# Patient Record
Sex: FEMALE | Race: WHITE | Employment: PART TIME | URBAN - METROPOLITAN AREA
[De-identification: names, ages, dates, MRNs, and addresses within clinical notes are randomized per-mention and may not be internally consistent; named-entity substitution may affect disease eponyms.]

---

## 2017-09-25 PROBLEM — Z11.4 SCREENING FOR HIV (HUMAN IMMUNODEFICIENCY VIRUS): Status: ACTIVE | Noted: 2017-09-25

## 2017-09-25 PROBLEM — R53.83 FATIGUE: Status: ACTIVE | Noted: 2017-09-25

## 2017-09-25 PROBLEM — R20.2 PARESTHESIA: Status: ACTIVE | Noted: 2017-09-25

## 2017-09-25 PROBLEM — M25.50 ARTHRALGIA: Status: ACTIVE | Noted: 2017-09-25

## 2017-09-25 PROBLEM — M54.2 NECK PAIN: Status: ACTIVE | Noted: 2017-09-25

## 2017-11-13 PROBLEM — B19.20 HEPATITIS C VIRUS INFECTION WITHOUT HEPATIC COMA: Status: ACTIVE | Noted: 2017-11-13

## 2018-04-12 PROBLEM — Z11.4 SCREENING FOR HIV (HUMAN IMMUNODEFICIENCY VIRUS): Status: RESOLVED | Noted: 2017-09-25 | Resolved: 2018-04-12

## 2018-04-27 ENCOUNTER — HOSPITAL ENCOUNTER (EMERGENCY)
Age: 28
Discharge: HOME | End: 2018-04-28
Attending: EMERGENCY MEDICINE
Payer: MEDICARE

## 2018-04-27 DIAGNOSIS — F19.10 POLYSUBSTANCE ABUSE (HCC): Primary | ICD-10-CM

## 2018-04-27 LAB
EKG ATRIAL RATE: 55 BPM
EKG P AXIS: -4 DEGREES
EKG P-R INTERVAL: 112 MS
EKG Q-T INTERVAL: 466 MS
EKG QRS DURATION: 96 MS
EKG QTC CALCULATION (BAZETT): 445 MS
EKG R AXIS: 75 DEGREES
EKG T AXIS: 62 DEGREES
EKG VENTRICULAR RATE: 55 BPM

## 2018-04-27 PROCEDURE — 84443 ASSAY THYROID STIM HORMONE: CPT

## 2018-04-27 PROCEDURE — 80053 COMPREHEN METABOLIC PANEL: CPT

## 2018-04-27 PROCEDURE — G0480 DRUG TEST DEF 1-7 CLASSES: HCPCS

## 2018-04-27 PROCEDURE — 93005 ELECTROCARDIOGRAM TRACING: CPT | Performed by: STUDENT IN AN ORGANIZED HEALTH CARE EDUCATION/TRAINING PROGRAM

## 2018-04-27 PROCEDURE — 85025 COMPLETE CBC W/AUTO DIFF WBC: CPT

## 2018-04-27 PROCEDURE — 80307 DRUG TEST PRSMV CHEM ANLYZR: CPT

## 2018-04-27 PROCEDURE — 36415 COLL VENOUS BLD VENIPUNCTURE: CPT

## 2018-04-27 PROCEDURE — 99285 EMERGENCY DEPT VISIT HI MDM: CPT

## 2018-04-28 ENCOUNTER — APPOINTMENT (OUTPATIENT)
Dept: GENERAL RADIOLOGY | Age: 28
End: 2018-04-28
Payer: MEDICARE

## 2018-04-28 ENCOUNTER — APPOINTMENT (OUTPATIENT)
Dept: CT IMAGING | Age: 28
End: 2018-04-28
Payer: MEDICARE

## 2018-04-28 VITALS
BODY MASS INDEX: 24.91 KG/M2 | TEMPERATURE: 98.7 F | SYSTOLIC BLOOD PRESSURE: 128 MMHG | HEIGHT: 66 IN | OXYGEN SATURATION: 100 % | HEART RATE: 67 BPM | DIASTOLIC BLOOD PRESSURE: 68 MMHG | RESPIRATION RATE: 14 BRPM | WEIGHT: 155 LBS

## 2018-04-28 LAB
ACETAMINOPHEN LEVEL: <5 MCG/ML (ref 10–30)
ALBUMIN SERPL-MCNC: 4 G/DL (ref 3.5–5.2)
ALP BLD-CCNC: 61 U/L (ref 35–104)
ALT SERPL-CCNC: 58 U/L (ref 0–32)
AMPHETAMINE SCREEN, URINE: NOT DETECTED
ANION GAP SERPL CALCULATED.3IONS-SCNC: 12 MMOL/L (ref 7–16)
AST SERPL-CCNC: 35 U/L (ref 0–31)
BARBITURATE SCREEN URINE: NOT DETECTED
BASOPHILS ABSOLUTE: 0.04 E9/L (ref 0–0.2)
BASOPHILS RELATIVE PERCENT: 0.6 % (ref 0–2)
BENZODIAZEPINE SCREEN, URINE: NOT DETECTED
BILIRUB SERPL-MCNC: 0.4 MG/DL (ref 0–1.2)
BUN BLDV-MCNC: 5 MG/DL (ref 6–20)
CALCIUM SERPL-MCNC: 9.3 MG/DL (ref 8.6–10.2)
CANNABINOID SCREEN URINE: POSITIVE
CHLORIDE BLD-SCNC: 103 MMOL/L (ref 98–107)
CHP ED QC CHECK: NORMAL
CO2: 27 MMOL/L (ref 22–29)
COCAINE METABOLITE SCREEN URINE: POSITIVE
CREAT SERPL-MCNC: 0.6 MG/DL (ref 0.5–1)
EOSINOPHILS ABSOLUTE: 0.15 E9/L (ref 0.05–0.5)
EOSINOPHILS RELATIVE PERCENT: 2.3 % (ref 0–6)
ETHANOL: <10 MG/DL (ref 0–0.08)
GFR AFRICAN AMERICAN: >60
GFR NON-AFRICAN AMERICAN: >60 ML/MIN/1.73
GLUCOSE BLD-MCNC: 121 MG/DL (ref 74–109)
HCT VFR BLD CALC: 39.3 % (ref 34–48)
HEMOGLOBIN: 12.9 G/DL (ref 11.5–15.5)
IMMATURE GRANULOCYTES #: 0.01 E9/L
IMMATURE GRANULOCYTES %: 0.2 % (ref 0–5)
LYMPHOCYTES ABSOLUTE: 2.78 E9/L (ref 1.5–4)
LYMPHOCYTES RELATIVE PERCENT: 43 % (ref 20–42)
MCH RBC QN AUTO: 28.6 PG (ref 26–35)
MCHC RBC AUTO-ENTMCNC: 32.8 % (ref 32–34.5)
MCV RBC AUTO: 87.1 FL (ref 80–99.9)
METHADONE SCREEN, URINE: NOT DETECTED
MONOCYTES ABSOLUTE: 0.59 E9/L (ref 0.1–0.95)
MONOCYTES RELATIVE PERCENT: 9.1 % (ref 2–12)
NEUTROPHILS ABSOLUTE: 2.89 E9/L (ref 1.8–7.3)
NEUTROPHILS RELATIVE PERCENT: 44.8 % (ref 43–80)
OPIATE SCREEN URINE: NOT DETECTED
PDW BLD-RTO: 13.1 FL (ref 11.5–15)
PHENCYCLIDINE SCREEN URINE: NOT DETECTED
PLATELET # BLD: 195 E9/L (ref 130–450)
PMV BLD AUTO: 10.5 FL (ref 7–12)
POTASSIUM SERPL-SCNC: 4 MMOL/L (ref 3.5–5)
PREGNANCY TEST URINE, POC: NEGATIVE
PROPOXYPHENE SCREEN: NOT DETECTED
RBC # BLD: 4.51 E12/L (ref 3.5–5.5)
SALICYLATE, SERUM: <0.3 MG/DL (ref 0–30)
SODIUM BLD-SCNC: 142 MMOL/L (ref 132–146)
TOTAL PROTEIN: 7.1 G/DL (ref 6.4–8.3)
TRICYCLIC ANTIDEPRESSANTS SCREEN SERUM: NEGATIVE NG/ML
TSH SERPL DL<=0.05 MIU/L-ACNC: 0.57 UIU/ML (ref 0.27–4.2)
WBC # BLD: 6.5 E9/L (ref 4.5–11.5)

## 2018-04-28 PROCEDURE — 70450 CT HEAD/BRAIN W/O DYE: CPT

## 2018-04-28 PROCEDURE — 71045 X-RAY EXAM CHEST 1 VIEW: CPT

## 2018-05-02 LAB — COCAINE, CONFIRM, URINE: >1000 NG/ML

## 2018-05-03 LAB — CANNABINOIDS CONF, URINE: 98 NG/ML

## 2018-05-04 ENCOUNTER — HOSPITAL ENCOUNTER (EMERGENCY)
Age: 28
Discharge: LEFT BEFORE DISCHARGE | End: 2018-05-04
Payer: MEDICARE

## 2018-05-04 VITALS
BODY MASS INDEX: 29.99 KG/M2 | WEIGHT: 180 LBS | HEIGHT: 65 IN | TEMPERATURE: 98.4 F | DIASTOLIC BLOOD PRESSURE: 77 MMHG | OXYGEN SATURATION: 97 % | RESPIRATION RATE: 16 BRPM | HEART RATE: 64 BPM | SYSTOLIC BLOOD PRESSURE: 117 MMHG

## 2018-05-04 PROCEDURE — 99284 EMERGENCY DEPT VISIT MOD MDM: CPT

## 2018-05-10 ASSESSMENT — ENCOUNTER SYMPTOMS
EYE REDNESS: 0
ABDOMINAL DISTENTION: 0
SINUS PRESSURE: 0
NAUSEA: 0
RHINORRHEA: 0
EYE PAIN: 0
PHOTOPHOBIA: 0
SHORTNESS OF BREATH: 0
BLOOD IN STOOL: 0
VOMITING: 0
WHEEZING: 0
ABDOMINAL PAIN: 0
COUGH: 0
BACK PAIN: 0
CONSTIPATION: 0
TROUBLE SWALLOWING: 0
CHEST TIGHTNESS: 0
SORE THROAT: 0
DIARRHEA: 0

## 2024-11-09 ENCOUNTER — HOSPITAL ENCOUNTER (EMERGENCY)
Age: 34
Discharge: HOME | End: 2024-11-09
Attending: STUDENT IN AN ORGANIZED HEALTH CARE EDUCATION/TRAINING PROGRAM
Payer: COMMERCIAL

## 2024-11-09 ENCOUNTER — APPOINTMENT (OUTPATIENT)
Dept: GENERAL RADIOLOGY | Age: 34
End: 2024-11-09
Payer: COMMERCIAL

## 2024-11-09 VITALS
TEMPERATURE: 97.8 F | HEART RATE: 74 BPM | SYSTOLIC BLOOD PRESSURE: 131 MMHG | OXYGEN SATURATION: 99 % | RESPIRATION RATE: 17 BRPM | DIASTOLIC BLOOD PRESSURE: 79 MMHG

## 2024-11-09 DIAGNOSIS — R10.84 DIFFUSE ABDOMINAL PAIN: ICD-10-CM

## 2024-11-09 DIAGNOSIS — R05.9 COUGH, UNSPECIFIED TYPE: ICD-10-CM

## 2024-11-09 DIAGNOSIS — L02.414 ABSCESS OF LEFT ARM: Primary | ICD-10-CM

## 2024-11-09 LAB
ALBUMIN SERPL-MCNC: 3.8 G/DL (ref 3.5–5.2)
ALP SERPL-CCNC: 80 U/L (ref 35–104)
ALT SERPL-CCNC: 72 U/L (ref 0–32)
ANION GAP SERPL CALCULATED.3IONS-SCNC: 8 MMOL/L (ref 7–16)
AST SERPL-CCNC: 63 U/L (ref 0–31)
BACTERIA URNS QL MICRO: ABNORMAL
BASOPHILS # BLD: 0.05 K/UL (ref 0–0.2)
BASOPHILS NFR BLD: 1 % (ref 0–2)
BILIRUB SERPL-MCNC: 0.3 MG/DL (ref 0–1.2)
BILIRUB UR QL STRIP: NEGATIVE
BUN SERPL-MCNC: 10 MG/DL (ref 6–20)
CALCIUM SERPL-MCNC: 8.7 MG/DL (ref 8.6–10.2)
CHLORIDE SERPL-SCNC: 104 MMOL/L (ref 98–107)
CLARITY UR: CLEAR
CO2 SERPL-SCNC: 25 MMOL/L (ref 22–29)
COLOR UR: YELLOW
CREAT SERPL-MCNC: 0.6 MG/DL (ref 0.5–1)
EOSINOPHIL # BLD: 0.15 K/UL (ref 0.05–0.5)
EOSINOPHILS RELATIVE PERCENT: 2 % (ref 0–6)
ERYTHROCYTE [DISTWIDTH] IN BLOOD BY AUTOMATED COUNT: 12.9 % (ref 11.5–15)
FLUAV RNA RESP QL NAA+PROBE: NOT DETECTED
FLUBV RNA RESP QL NAA+PROBE: NOT DETECTED
GFR, ESTIMATED: >90 ML/MIN/1.73M2
GLUCOSE SERPL-MCNC: 88 MG/DL (ref 74–99)
GLUCOSE UR STRIP-MCNC: NEGATIVE MG/DL
HCG, URINE, POC: NEGATIVE
HCT VFR BLD AUTO: 39.5 % (ref 34–48)
HGB BLD-MCNC: 12.6 G/DL (ref 11.5–15.5)
HGB UR QL STRIP.AUTO: NEGATIVE
IMM GRANULOCYTES # BLD AUTO: <0.03 K/UL (ref 0–0.58)
IMM GRANULOCYTES NFR BLD: 0 % (ref 0–5)
KETONES UR STRIP-MCNC: NEGATIVE MG/DL
LACTATE BLDV-SCNC: 1.1 MMOL/L (ref 0.5–1.9)
LEUKOCYTE ESTERASE UR QL STRIP: NEGATIVE
LIPASE SERPL-CCNC: 19 U/L (ref 13–60)
LYMPHOCYTES NFR BLD: 2.94 K/UL (ref 1.5–4)
LYMPHOCYTES RELATIVE PERCENT: 40 % (ref 20–42)
Lab: NORMAL
MCH RBC QN AUTO: 28.4 PG (ref 26–35)
MCHC RBC AUTO-ENTMCNC: 31.9 G/DL (ref 32–34.5)
MCV RBC AUTO: 89.2 FL (ref 80–99.9)
MICROORGANISM SPEC CULT: NORMAL
MICROORGANISM/AGENT SPEC: NORMAL
MONOCYTES NFR BLD: 0.65 K/UL (ref 0.1–0.95)
MONOCYTES NFR BLD: 9 % (ref 2–12)
NEGATIVE QC PASS/FAIL: NORMAL
NEUTROPHILS NFR BLD: 49 % (ref 43–80)
NEUTS SEG NFR BLD: 3.58 K/UL (ref 1.8–7.3)
NITRITE UR QL STRIP: NEGATIVE
PH UR STRIP: 6 [PH] (ref 5–9)
PLATELET # BLD AUTO: 232 K/UL (ref 130–450)
PMV BLD AUTO: 10.8 FL (ref 7–12)
POSITIVE QC PASS/FAIL: NORMAL
POTASSIUM SERPL-SCNC: 5.2 MMOL/L (ref 3.5–5)
PROT SERPL-MCNC: 6.7 G/DL (ref 6.4–8.3)
PROT UR STRIP-MCNC: NEGATIVE MG/DL
RBC # BLD AUTO: 4.43 M/UL (ref 3.5–5.5)
RBC #/AREA URNS HPF: ABNORMAL /HPF
SARS-COV-2 RNA RESP QL NAA+PROBE: NOT DETECTED
SODIUM SERPL-SCNC: 137 MMOL/L (ref 132–146)
SOURCE: NORMAL
SP GR UR STRIP: 1.02 (ref 1–1.03)
SPECIMEN DESCRIPTION: NORMAL
SPECIMEN DESCRIPTION: NORMAL
UROBILINOGEN UR STRIP-ACNC: 0.2 EU/DL (ref 0–1)
WBC #/AREA URNS HPF: ABNORMAL /HPF
WBC OTHER # BLD: 7.4 K/UL (ref 4.5–11.5)

## 2024-11-09 PROCEDURE — 80053 COMPREHEN METABOLIC PANEL: CPT

## 2024-11-09 PROCEDURE — 96372 THER/PROPH/DIAG INJ SC/IM: CPT

## 2024-11-09 PROCEDURE — 83690 ASSAY OF LIPASE: CPT

## 2024-11-09 PROCEDURE — 99284 EMERGENCY DEPT VISIT MOD MDM: CPT

## 2024-11-09 PROCEDURE — 6370000000 HC RX 637 (ALT 250 FOR IP)

## 2024-11-09 PROCEDURE — 2500000003 HC RX 250 WO HCPCS

## 2024-11-09 PROCEDURE — 81001 URINALYSIS AUTO W/SCOPE: CPT

## 2024-11-09 PROCEDURE — 87205 SMEAR GRAM STAIN: CPT

## 2024-11-09 PROCEDURE — 87636 SARSCOV2 & INF A&B AMP PRB: CPT

## 2024-11-09 PROCEDURE — 71046 X-RAY EXAM CHEST 2 VIEWS: CPT

## 2024-11-09 PROCEDURE — 85025 COMPLETE CBC W/AUTO DIFF WBC: CPT

## 2024-11-09 PROCEDURE — 83605 ASSAY OF LACTIC ACID: CPT

## 2024-11-09 PROCEDURE — 6360000002 HC RX W HCPCS

## 2024-11-09 PROCEDURE — 87070 CULTURE OTHR SPECIMN AEROBIC: CPT

## 2024-11-09 PROCEDURE — 10061 I&D ABSCESS COMP/MULTIPLE: CPT

## 2024-11-09 RX ORDER — KETOROLAC TROMETHAMINE 30 MG/ML
15 INJECTION, SOLUTION INTRAMUSCULAR; INTRAVENOUS ONCE
Status: COMPLETED | OUTPATIENT
Start: 2024-11-09 | End: 2024-11-09

## 2024-11-09 RX ORDER — DOXYCYCLINE HYCLATE 100 MG
100 TABLET ORAL 2 TIMES DAILY
Qty: 14 TABLET | Refills: 0 | Status: SHIPPED | OUTPATIENT
Start: 2024-11-09 | End: 2024-11-16

## 2024-11-09 RX ORDER — LIDOCAINE HYDROCHLORIDE 10 MG/ML
5 INJECTION, SOLUTION INFILTRATION; PERINEURAL ONCE
Status: COMPLETED | OUTPATIENT
Start: 2024-11-09 | End: 2024-11-09

## 2024-11-09 RX ORDER — DOXYCYCLINE 100 MG/1
100 CAPSULE ORAL ONCE
Status: COMPLETED | OUTPATIENT
Start: 2024-11-09 | End: 2024-11-09

## 2024-11-09 RX ORDER — CEPHALEXIN 500 MG/1
500 CAPSULE ORAL 4 TIMES DAILY
Qty: 28 CAPSULE | Refills: 0 | Status: SHIPPED | OUTPATIENT
Start: 2024-11-09 | End: 2024-11-16

## 2024-11-09 RX ORDER — KETOROLAC TROMETHAMINE 30 MG/ML
15 INJECTION, SOLUTION INTRAMUSCULAR; INTRAVENOUS ONCE
Status: DISCONTINUED | OUTPATIENT
Start: 2024-11-09 | End: 2024-11-09

## 2024-11-09 RX ORDER — ONDANSETRON 2 MG/ML
4 INJECTION INTRAMUSCULAR; INTRAVENOUS ONCE
Status: DISCONTINUED | OUTPATIENT
Start: 2024-11-09 | End: 2024-11-09

## 2024-11-09 RX ORDER — ONDANSETRON 4 MG/1
4 TABLET, ORALLY DISINTEGRATING ORAL ONCE
Status: COMPLETED | OUTPATIENT
Start: 2024-11-09 | End: 2024-11-09

## 2024-11-09 RX ADMIN — KETOROLAC TROMETHAMINE 15 MG: 30 INJECTION, SOLUTION INTRAMUSCULAR at 20:30

## 2024-11-09 RX ADMIN — ONDANSETRON 4 MG: 4 TABLET, ORALLY DISINTEGRATING ORAL at 20:36

## 2024-11-09 RX ADMIN — LIDOCAINE HYDROCHLORIDE 5 ML: 10 INJECTION, SOLUTION INFILTRATION; PERINEURAL at 19:49

## 2024-11-09 RX ADMIN — DOXYCYCLINE HYCLATE 100 MG: 100 CAPSULE ORAL at 21:49

## 2024-11-09 RX ADMIN — CEPHALEXIN 250 MG: 250 CAPSULE ORAL at 21:50

## 2024-11-09 ASSESSMENT — PAIN DESCRIPTION - LOCATION: LOCATION: ARM

## 2024-11-09 ASSESSMENT — ENCOUNTER SYMPTOMS
NAUSEA: 1
ALLERGIC/IMMUNOLOGIC NEGATIVE: 1
COUGH: 1
ABDOMINAL PAIN: 1
EYES NEGATIVE: 1

## 2024-11-09 ASSESSMENT — LIFESTYLE VARIABLES
HOW MANY STANDARD DRINKS CONTAINING ALCOHOL DO YOU HAVE ON A TYPICAL DAY: PATIENT DOES NOT DRINK
HOW OFTEN DO YOU HAVE A DRINK CONTAINING ALCOHOL: NEVER

## 2024-11-09 ASSESSMENT — PAIN DESCRIPTION - ORIENTATION: ORIENTATION: LEFT;LOWER;MID

## 2024-11-09 ASSESSMENT — PAIN DESCRIPTION - DESCRIPTORS: DESCRIPTORS: THROBBING;ACHING;BURNING

## 2024-11-09 ASSESSMENT — PAIN SCALES - GENERAL: PAINLEVEL_OUTOF10: 10

## 2024-11-09 ASSESSMENT — PAIN - FUNCTIONAL ASSESSMENT: PAIN_FUNCTIONAL_ASSESSMENT: PREVENTS OR INTERFERES SOME ACTIVE ACTIVITIES AND ADLS

## 2024-11-10 NOTE — ED PROVIDER NOTES
[facility] EMERGENCY DEPARTMENT  EMERGENCY DEPARTMENT ENCOUNTER        Pt Name: Aileen Raman  MRN: [id number]  Birthdate 1990  Date of evaluation: 11/9/2024  Provider: VINCENT Price - CNP  PCP: Shawn Daniel DO  Note Started: 7:04 PM EST 11/9/24       I have seen and evaluated this patient with my supervising physician Ana Yoo DO.      CHIEF COMPLAINT       Chief Complaint   Patient presents with    Abscess     Patient has abscess on left forearm. Patient states she has used IV drugs, but not in that area.     Parasite     Patient states she has parasites coming out of her mouth, stool, and nose. \"I got samples.\"    Abdominal Pain    Cough    Nausea       HISTORY OF PRESENT ILLNESS: 1 or more Elements     History from : Patient    Limitations to history : None    Aileen Raman is a 34 y.o. female who presents to the ED with complaints of an abscess to her left forearm, cough, concern of coughing up parasites and parasites in her stool, nausea, and abdominal pain.  Patient is unable to state how long the symptoms have been going on for.  Patient states she did use IV methamphetamine a few times last week but has not used since.  Patient states prior to using methamphetamine last week she was clean for 5 years due to being in [facility].  Patient states she was recently released and her friends were using methamphetamine so she decided to use it with them.  Patient denies any chest pain, shortness of breath, headache, dizziness, lightheadedness, fever, chills, body aches, dysuria, vomiting, dark or bloody stools, SI, HI.  Patient denies any other symptoms.  Patient has no other complaints at this time.    Nursing Notes were all reviewed and agreed with or any disagreements were addressed in the HPI.    REVIEW OF SYSTEMS :      Review of Systems   Constitutional: Negative.    HENT: Negative.     Eyes: Negative.    Respiratory:  Positive

## 2024-11-12 LAB
MICROORGANISM SPEC CULT: NO GROWTH
MICROORGANISM/AGENT SPEC: NORMAL
SPECIMEN DESCRIPTION: NORMAL

## 2025-02-15 ENCOUNTER — HOSPITAL ENCOUNTER (INPATIENT)
Age: 35
LOS: 2 days | Discharge: HOME | DRG: 492 | End: 2025-02-18
Attending: EMERGENCY MEDICINE
Payer: OTHER MISCELLANEOUS

## 2025-02-15 DIAGNOSIS — S00.83XA CONTUSION OF FACE, INITIAL ENCOUNTER: ICD-10-CM

## 2025-02-15 DIAGNOSIS — S82.891A CLOSED FRACTURE DISLOCATION OF ANKLE JOINT, RIGHT, INITIAL ENCOUNTER: ICD-10-CM

## 2025-02-15 DIAGNOSIS — S20.212A CONTUSION OF LEFT CHEST WALL, INITIAL ENCOUNTER: ICD-10-CM
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
## 2025-02-15 LAB
B.E.: 0.3 MMOL/L (ref -3–3)
COHB: 3 % (ref 0–1.5)
CRITICAL: ABNORMAL
DATE ANALYZED: ABNORMAL
DATE OF COLLECTION: ABNORMAL
HCO3: 25.4 MMOL/L (ref 22–26)
HHB: 0.6 % (ref 0–5)
LAB: ABNORMAL
Lab: 2335
METHB: 0.2 % (ref 0–1.5)
MODE: ABNORMAL
O2 SATURATION: 99.4 % (ref 92–98.5)
O2HB: 96.2 % (ref 94–97)
OPERATOR ID: 2067
PATIENT TEMP: 37 C
PCO2: 43.2 MMHG (ref 35–45)
PH BLOOD GAS: 7.39 (ref 7.35–7.45)
PO2: 441.5 MMHG (ref 75–100)
POTASSIUM SERPL-SCNC: 3.84 MMOL/L (ref 3.5–5)
SOURCE, BLOOD GAS: ABNORMAL
THB: 12 G/DL (ref 11.5–16.5)
TIME ANALYZED: 2344

## 2025-02-15 PROCEDURE — 80053 COMPREHEN METABOLIC PANEL: CPT

## 2025-02-15 PROCEDURE — 86850 RBC ANTIBODY SCREEN: CPT

## 2025-02-15 PROCEDURE — 86901 BLOOD TYPING SEROLOGIC RH(D): CPT

## 2025-02-15 PROCEDURE — 80179 DRUG ASSAY SALICYLATE: CPT

## 2025-02-15 PROCEDURE — 83605 ASSAY OF LACTIC ACID: CPT

## 2025-02-15 PROCEDURE — 80307 DRUG TEST PRSMV CHEM ANLYZR: CPT

## 2025-02-15 PROCEDURE — G0480 DRUG TEST DEF 1-7 CLASSES: HCPCS

## 2025-02-15 PROCEDURE — 86900 BLOOD TYPING SEROLOGIC ABO: CPT

## 2025-02-15 PROCEDURE — 85347 COAGULATION TIME ACTIVATED: CPT

## 2025-02-15 PROCEDURE — 80143 DRUG ASSAY ACETAMINOPHEN: CPT

## 2025-02-15 PROCEDURE — 85384 FIBRINOGEN ACTIVITY: CPT

## 2025-02-15 PROCEDURE — 85390 FIBRINOLYSINS SCREEN I&R: CPT

## 2025-02-15 PROCEDURE — 99285 EMERGENCY DEPT VISIT HI MDM: CPT

## 2025-02-15 PROCEDURE — 85025 COMPLETE CBC W/AUTO DIFF WBC: CPT

## 2025-02-15 PROCEDURE — 36415 COLL VENOUS BLD VENIPUNCTURE: CPT

## 2025-02-15 PROCEDURE — 6810039000 HC L1 TRAUMA ALERT

## 2025-02-15 PROCEDURE — 85730 THROMBOPLASTIN TIME PARTIAL: CPT

## 2025-02-15 PROCEDURE — 82805 BLOOD GASES W/O2 SATURATION: CPT

## 2025-02-15 PROCEDURE — 85610 PROTHROMBIN TIME: CPT

## 2025-02-15 PROCEDURE — 84132 ASSAY OF SERUM POTASSIUM: CPT

## 2025-02-15 PROCEDURE — 85576 BLOOD PLATELET AGGREGATION: CPT

## 2025-02-16 ENCOUNTER — APPOINTMENT (OUTPATIENT)
Dept: GENERAL RADIOLOGY | Age: 35
DRG: 492 | End: 2025-02-16
Payer: OTHER MISCELLANEOUS

## 2025-02-16 ENCOUNTER — ANESTHESIA (OUTPATIENT)
Dept: OPERATING ROOM | Age: 35
End: 2025-02-16
Payer: OTHER MISCELLANEOUS

## 2025-02-16 ENCOUNTER — APPOINTMENT (OUTPATIENT)
Dept: CT IMAGING | Age: 35
DRG: 492 | End: 2025-02-16
Payer: OTHER MISCELLANEOUS

## 2025-02-16 ENCOUNTER — ANESTHESIA EVENT (OUTPATIENT)
Dept: OPERATING ROOM | Age: 35
End: 2025-02-16
Payer: OTHER MISCELLANEOUS
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
## 2025-02-16 PROBLEM — S92.101A CLOSED DISPLACED FRACTURE OF RIGHT TALUS: Status: ACTIVE | Noted: 2025-02-16

## 2025-02-16 PROBLEM — K66.1 HEMOPERITONEUM: Status: ACTIVE | Noted: 2025-02-16

## 2025-02-16 PROBLEM — S92.341A CLOSED DISPLACED FRACTURE OF FOURTH METATARSAL BONE OF RIGHT FOOT: Status: ACTIVE | Noted: 2025-02-16

## 2025-02-16 PROBLEM — S93.314A: Status: ACTIVE | Noted: 2025-02-16

## 2025-02-16 LAB
ABO + RH BLD: NORMAL
ALBUMIN SERPL-MCNC: 3.9 G/DL (ref 3.5–5.2)
ALP SERPL-CCNC: 88 U/L (ref 35–104)
ALT SERPL-CCNC: 74 U/L (ref 0–32)
AMPHET UR QL SCN: POSITIVE
ANION GAP SERPL CALCULATED.3IONS-SCNC: 5 MMOL/L (ref 7–16)
ANION GAP SERPL CALCULATED.3IONS-SCNC: 7 MMOL/L (ref 7–16)
APAP SERPL-MCNC: <5 UG/ML (ref 10–30)
ARM BAND NUMBER: NORMAL
AST SERPL-CCNC: 65 U/L (ref 0–31)
BARBITURATES UR QL SCN: NEGATIVE
BASOPHILS # BLD: 0.03 K/UL (ref 0–0.2)
BASOPHILS NFR BLD: 1 % (ref 0–2)
BENZODIAZ UR QL: POSITIVE
BILIRUB SERPL-MCNC: 0.4 MG/DL (ref 0–1.2)
BLOOD BANK SAMPLE EXPIRATION: NORMAL
BLOOD GROUP ANTIBODIES SERPL: NEGATIVE
BUN SERPL-MCNC: 10 MG/DL (ref 6–20)
BUN SERPL-MCNC: 8 MG/DL (ref 6–20)
BUPRENORPHINE UR QL: POSITIVE
CALCIUM SERPL-MCNC: 8.3 MG/DL (ref 8.6–10.2)
CALCIUM SERPL-MCNC: 8.8 MG/DL (ref 8.6–10.2)
CANNABINOIDS UR QL SCN: NEGATIVE
CHLORIDE SERPL-SCNC: 106 MMOL/L (ref 98–107)
CHLORIDE SERPL-SCNC: 107 MMOL/L (ref 98–107)
CLOT ANGLE.KAOLIN INDUCED BLD RES TEG: 75.1 DEG (ref 53–70)
CO2 SERPL-SCNC: 25 MMOL/L (ref 22–29)
CO2 SERPL-SCNC: 27 MMOL/L (ref 22–29)
COCAINE UR QL SCN: NEGATIVE
CREAT SERPL-MCNC: 0.6 MG/DL (ref 0.5–1)
CREAT SERPL-MCNC: 0.7 MG/DL (ref 0.5–1)
EOSINOPHIL # BLD: 0.16 K/UL (ref 0.05–0.5)
EOSINOPHILS RELATIVE PERCENT: 4 % (ref 0–6)
EPL-TEG: 0 % (ref 0–15)
ERYTHROCYTE [DISTWIDTH] IN BLOOD BY AUTOMATED COUNT: 13.3 % (ref 11.5–15)
ETHANOLAMINE SERPL-MCNC: <10 MG/DL (ref 0–0.08)
ETHANOLAMINE SERPL-MCNC: <10 MG/DL (ref 0–0.08)
FENTANYL UR QL: NEGATIVE
G-TEG: 8.7 KDYN/CM2 (ref 4.5–11)
GFR, ESTIMATED: 55 ML/MIN/1.73M2
GFR, ESTIMATED: >90 ML/MIN/1.73M2
GLUCOSE SERPL-MCNC: 72 MG/DL (ref 74–99)
GLUCOSE SERPL-MCNC: 99 MG/DL (ref 74–99)
HCG, URINE, POC: NEGATIVE
HCT VFR BLD AUTO: 33.6 % (ref 34–48)
HGB BLD-MCNC: 10.9 G/DL (ref 11.5–15.5)
IMM GRANULOCYTES # BLD AUTO: <0.03 K/UL (ref 0–0.58)
IMM GRANULOCYTES NFR BLD: 0 % (ref 0–5)
INR PPP: 1.1
KINETICS TEG: 1 MIN (ref 1–3)
LACTATE BLDV-SCNC: 0.9 MMOL/L (ref 0.5–2.2)
LY30 (LYSIS) TEG: 0 % (ref 0–8)
LYMPHOCYTES NFR BLD: 2.15 K/UL (ref 1.5–4)
LYMPHOCYTES RELATIVE PERCENT: 48 % (ref 20–42)
Lab: NORMAL
MA (MAX CLOT) TEG: 63.4 MM (ref 50–70)
MCH RBC QN AUTO: 28.7 PG (ref 26–35)
MCHC RBC AUTO-ENTMCNC: 32.4 G/DL (ref 32–34.5)
MCV RBC AUTO: 88.4 FL (ref 80–99.9)
METHADONE UR QL: NEGATIVE
MONOCYTES NFR BLD: 0.47 K/UL (ref 0.1–0.95)
MONOCYTES NFR BLD: 10 % (ref 2–12)
NEGATIVE QC PASS/FAIL: NORMAL
NEUTROPHILS NFR BLD: 38 % (ref 43–80)
NEUTS SEG NFR BLD: 1.71 K/UL (ref 1.8–7.3)
OPIATES UR QL SCN: NEGATIVE
OXYCODONE UR QL SCN: NEGATIVE
PARTIAL THROMBOPLASTIN TIME: 35.5 SEC (ref 24.5–35.1)
PCP UR QL SCN: NEGATIVE
PLATELET # BLD AUTO: 176 K/UL (ref 130–450)
PMV BLD AUTO: 11 FL (ref 7–12)
POSITIVE QC PASS/FAIL: NORMAL
POTASSIUM SERPL-SCNC: 4 MMOL/L (ref 3.5–5)
POTASSIUM SERPL-SCNC: 4 MMOL/L (ref 3.5–5)
PROT SERPL-MCNC: 6 G/DL (ref 6.4–8.3)
PROTHROMBIN TIME: 12.1 SEC (ref 9.3–12.4)
RBC # BLD AUTO: 3.8 M/UL (ref 3.5–5.5)
REACTION TIME TEG: 4.2 MIN (ref 5–10)
SALICYLATES SERPL-MCNC: <0.3 MG/DL (ref 0–30)
SODIUM SERPL-SCNC: 136 MMOL/L (ref 132–146)
SODIUM SERPL-SCNC: 141 MMOL/L (ref 132–146)
TEST INFORMATION: ABNORMAL
TOXIC TRICYCLIC SC,BLOOD: NEGATIVE
WBC OTHER # BLD: 4.5 K/UL (ref 4.5–11.5)

## 2025-02-16 PROCEDURE — 2709999900 HC NON-CHARGEABLE SUPPLY: Performed by: ORTHOPAEDIC SURGERY

## 2025-02-16 PROCEDURE — 2720000010 HC SURG SUPPLY STERILE: Performed by: ORTHOPAEDIC SURGERY

## 2025-02-16 PROCEDURE — C1713 ANCHOR/SCREW BN/BN,TIS/BN: HCPCS | Performed by: ORTHOPAEDIC SURGERY

## 2025-02-16 PROCEDURE — 3E0T3BZ INTRODUCTION OF ANESTHETIC AGENT INTO PERIPHERAL NERVES AND PLEXI, PERCUTANEOUS APPROACH: ICD-10-PCS | Performed by: STUDENT IN AN ORGANIZED HEALTH CARE EDUCATION/TRAINING PROGRAM

## 2025-02-16 PROCEDURE — 70450 CT HEAD/BRAIN W/O DYE: CPT

## 2025-02-16 PROCEDURE — 2580000003 HC RX 258

## 2025-02-16 PROCEDURE — 3600000005 HC SURGERY LEVEL 5 BASE: Performed by: ORTHOPAEDIC SURGERY

## 2025-02-16 PROCEDURE — 96374 THER/PROPH/DIAG INJ IV PUSH: CPT

## 2025-02-16 PROCEDURE — 06HY33Z INSERTION OF INFUSION DEVICE INTO LOWER VEIN, PERCUTANEOUS APPROACH: ICD-10-PCS

## 2025-02-16 PROCEDURE — 74177 CT ABD & PELVIS W/CONTRAST: CPT

## 2025-02-16 PROCEDURE — 64445 NJX AA&/STRD SCIATIC NRV IMG: CPT | Performed by: STUDENT IN AN ORGANIZED HEALTH CARE EDUCATION/TRAINING PROGRAM

## 2025-02-16 PROCEDURE — 3600000015 HC SURGERY LEVEL 5 ADDTL 15MIN: Performed by: ORTHOPAEDIC SURGERY

## 2025-02-16 PROCEDURE — 0QHL35Z INSERTION OF EXTERNAL FIXATION DEVICE INTO RIGHT TARSAL, PERCUTANEOUS APPROACH: ICD-10-PCS | Performed by: ORTHOPAEDIC SURGERY

## 2025-02-16 PROCEDURE — 2500000003 HC RX 250 WO HCPCS

## 2025-02-16 PROCEDURE — 73700 CT LOWER EXTREMITY W/O DYE: CPT

## 2025-02-16 PROCEDURE — 73610 X-RAY EXAM OF ANKLE: CPT

## 2025-02-16 PROCEDURE — G0480 DRUG TEST DEF 1-7 CLASSES: HCPCS

## 2025-02-16 PROCEDURE — 6360000002 HC RX W HCPCS

## 2025-02-16 PROCEDURE — 6360000004 HC RX CONTRAST MEDICATION: Performed by: RADIOLOGY

## 2025-02-16 PROCEDURE — 0QHG35Z INSERTION OF EXTERNAL FIXATION DEVICE INTO RIGHT TIBIA, PERCUTANEOUS APPROACH: ICD-10-PCS | Performed by: ORTHOPAEDIC SURGERY

## 2025-02-16 PROCEDURE — 80307 DRUG TEST PRSMV CHEM ANLYZR: CPT

## 2025-02-16 PROCEDURE — 99223 1ST HOSP IP/OBS HIGH 75: CPT | Performed by: SURGERY

## 2025-02-16 PROCEDURE — 3700000000 HC ANESTHESIA ATTENDED CARE: Performed by: ORTHOPAEDIC SURGERY

## 2025-02-16 PROCEDURE — 6360000002 HC RX W HCPCS: Performed by: STUDENT IN AN ORGANIZED HEALTH CARE EDUCATION/TRAINING PROGRAM

## 2025-02-16 PROCEDURE — 72170 X-RAY EXAM OF PELVIS: CPT

## 2025-02-16 PROCEDURE — 72125 CT NECK SPINE W/O DYE: CPT

## 2025-02-16 PROCEDURE — 3700000001 HC ADD 15 MINUTES (ANESTHESIA): Performed by: ORTHOPAEDIC SURGERY

## 2025-02-16 PROCEDURE — 6370000000 HC RX 637 (ALT 250 FOR IP)

## 2025-02-16 PROCEDURE — 71045 X-RAY EXAM CHEST 1 VIEW: CPT

## 2025-02-16 PROCEDURE — 2500000003 HC RX 250 WO HCPCS: Performed by: STUDENT IN AN ORGANIZED HEALTH CARE EDUCATION/TRAINING PROGRAM

## 2025-02-16 PROCEDURE — 36415 COLL VENOUS BLD VENIPUNCTURE: CPT

## 2025-02-16 PROCEDURE — 36600 WITHDRAWAL OF ARTERIAL BLOOD: CPT | Performed by: SURGERY

## 2025-02-16 PROCEDURE — 71260 CT THORAX DX C+: CPT

## 2025-02-16 PROCEDURE — 73600 X-RAY EXAM OF ANKLE: CPT

## 2025-02-16 PROCEDURE — 7100000000 HC PACU RECOVERY - FIRST 15 MIN: Performed by: ORTHOPAEDIC SURGERY

## 2025-02-16 PROCEDURE — 1200000000 HC SEMI PRIVATE

## 2025-02-16 PROCEDURE — 0QHN35Z INSERTION OF EXTERNAL FIXATION DEVICE INTO RIGHT METATARSAL, PERCUTANEOUS APPROACH: ICD-10-PCS | Performed by: ORTHOPAEDIC SURGERY

## 2025-02-16 PROCEDURE — 64447 NJX AA&/STRD FEMORAL NRV IMG: CPT | Performed by: STUDENT IN AN ORGANIZED HEALTH CARE EDUCATION/TRAINING PROGRAM

## 2025-02-16 PROCEDURE — 7100000001 HC PACU RECOVERY - ADDTL 15 MIN: Performed by: ORTHOPAEDIC SURGERY

## 2025-02-16 PROCEDURE — 6370000000 HC RX 637 (ALT 250 FOR IP): Performed by: STUDENT IN AN ORGANIZED HEALTH CARE EDUCATION/TRAINING PROGRAM

## 2025-02-16 PROCEDURE — 80048 BASIC METABOLIC PNL TOTAL CA: CPT

## 2025-02-16 RX ORDER — SODIUM CHLORIDE 0.9 % (FLUSH) 0.9 %
5-40 SYRINGE (ML) INJECTION EVERY 12 HOURS SCHEDULED
Status: DISCONTINUED | OUTPATIENT
Start: 2025-02-16 | End: 2025-02-16 | Stop reason: HOSPADM

## 2025-02-16 RX ORDER — ONDANSETRON 4 MG/1
4 TABLET, ORALLY DISINTEGRATING ORAL EVERY 8 HOURS PRN
Status: DISCONTINUED | OUTPATIENT
Start: 2025-02-16 | End: 2025-02-18 | Stop reason: HOSPADM

## 2025-02-16 RX ORDER — METHOCARBAMOL 500 MG/1
1000 TABLET, FILM COATED ORAL 4 TIMES DAILY
Status: DISCONTINUED | OUTPATIENT
Start: 2025-02-16 | End: 2025-02-18 | Stop reason: HOSPADM

## 2025-02-16 RX ORDER — SODIUM CHLORIDE, SODIUM LACTATE, POTASSIUM CHLORIDE, CALCIUM CHLORIDE 600; 310; 30; 20 MG/100ML; MG/100ML; MG/100ML; MG/100ML
INJECTION, SOLUTION INTRAVENOUS
Status: DISCONTINUED | OUTPATIENT
Start: 2025-02-16 | End: 2025-02-16 | Stop reason: SDUPTHER

## 2025-02-16 RX ORDER — ROPIVACAINE HYDROCHLORIDE 5 MG/ML
INJECTION, SOLUTION EPIDURAL; INFILTRATION; PERINEURAL
Status: COMPLETED
Start: 2025-02-16 | End: 2025-02-16

## 2025-02-16 RX ORDER — PROPOFOL 10 MG/ML
INJECTION, EMULSION INTRAVENOUS
Status: DISCONTINUED | OUTPATIENT
Start: 2025-02-16 | End: 2025-02-16 | Stop reason: SDUPTHER

## 2025-02-16 RX ORDER — MIDAZOLAM HYDROCHLORIDE 1 MG/ML
INJECTION, SOLUTION INTRAMUSCULAR; INTRAVENOUS
Status: DISCONTINUED | OUTPATIENT
Start: 2025-02-16 | End: 2025-02-16 | Stop reason: SDUPTHER

## 2025-02-16 RX ORDER — FENTANYL CITRATE 50 UG/ML
INJECTION, SOLUTION INTRAMUSCULAR; INTRAVENOUS
Status: DISCONTINUED | OUTPATIENT
Start: 2025-02-16 | End: 2025-02-16 | Stop reason: SDUPTHER

## 2025-02-16 RX ORDER — SODIUM CHLORIDE 9 MG/ML
INJECTION, SOLUTION INTRAVENOUS PRN
Status: DISCONTINUED | OUTPATIENT
Start: 2025-02-16 | End: 2025-02-18 | Stop reason: HOSPADM

## 2025-02-16 RX ORDER — ONDANSETRON 2 MG/ML
4 INJECTION INTRAMUSCULAR; INTRAVENOUS
Status: DISCONTINUED | OUTPATIENT
Start: 2025-02-16 | End: 2025-02-16 | Stop reason: HOSPADM

## 2025-02-16 RX ORDER — SODIUM CHLORIDE 9 MG/ML
INJECTION, SOLUTION INTRAVENOUS PRN
Status: DISCONTINUED | OUTPATIENT
Start: 2025-02-16 | End: 2025-02-16 | Stop reason: HOSPADM

## 2025-02-16 RX ORDER — OXYCODONE HYDROCHLORIDE 10 MG/1
10 TABLET ORAL EVERY 4 HOURS PRN
Status: DISCONTINUED | OUTPATIENT
Start: 2025-02-16 | End: 2025-02-18 | Stop reason: HOSPADM

## 2025-02-16 RX ORDER — LIDOCAINE HYDROCHLORIDE 20 MG/ML
INJECTION, SOLUTION INTRAVENOUS
Status: DISCONTINUED | OUTPATIENT
Start: 2025-02-16 | End: 2025-02-16 | Stop reason: SDUPTHER

## 2025-02-16 RX ORDER — ENOXAPARIN SODIUM 100 MG/ML
30 INJECTION SUBCUTANEOUS 2 TIMES DAILY
Status: DISCONTINUED | OUTPATIENT
Start: 2025-02-16 | End: 2025-02-18 | Stop reason: HOSPADM

## 2025-02-16 RX ORDER — TRANEXAMIC ACID 10 MG/ML
1000 INJECTION, SOLUTION INTRAVENOUS
Status: ACTIVE | OUTPATIENT
Start: 2025-02-16 | End: 2025-02-16

## 2025-02-16 RX ORDER — DIPHENHYDRAMINE HYDROCHLORIDE 50 MG/ML
12.5 INJECTION INTRAMUSCULAR; INTRAVENOUS
Status: DISCONTINUED | OUTPATIENT
Start: 2025-02-16 | End: 2025-02-16 | Stop reason: HOSPADM

## 2025-02-16 RX ORDER — SODIUM CHLORIDE 0.9 % (FLUSH) 0.9 %
5-40 SYRINGE (ML) INJECTION PRN
Status: DISCONTINUED | OUTPATIENT
Start: 2025-02-16 | End: 2025-02-16 | Stop reason: HOSPADM

## 2025-02-16 RX ORDER — HYDROMORPHONE HYDROCHLORIDE 1 MG/ML
0.5 INJECTION, SOLUTION INTRAMUSCULAR; INTRAVENOUS; SUBCUTANEOUS EVERY 5 MIN PRN
Status: DISCONTINUED | OUTPATIENT
Start: 2025-02-16 | End: 2025-02-16 | Stop reason: HOSPADM

## 2025-02-16 RX ORDER — MEPERIDINE HYDROCHLORIDE 25 MG/ML
12.5 INJECTION INTRAMUSCULAR; INTRAVENOUS; SUBCUTANEOUS
Status: DISCONTINUED | OUTPATIENT
Start: 2025-02-16 | End: 2025-02-16 | Stop reason: HOSPADM

## 2025-02-16 RX ORDER — IPRATROPIUM BROMIDE AND ALBUTEROL SULFATE 2.5; .5 MG/3ML; MG/3ML
1 SOLUTION RESPIRATORY (INHALATION)
Status: DISCONTINUED | OUTPATIENT
Start: 2025-02-16 | End: 2025-02-16 | Stop reason: HOSPADM

## 2025-02-16 RX ORDER — ROPIVACAINE HYDROCHLORIDE 5 MG/ML
INJECTION, SOLUTION EPIDURAL; INFILTRATION; PERINEURAL
Status: DISCONTINUED | OUTPATIENT
Start: 2025-02-16 | End: 2025-02-16 | Stop reason: SDUPTHER

## 2025-02-16 RX ORDER — ASPIRIN 325 MG
325 TABLET, DELAYED RELEASE (ENTERIC COATED) ORAL 2 TIMES DAILY
Qty: 56 TABLET | Refills: 0 | Status: SHIPPED | OUTPATIENT
Start: 2025-02-16 | End: 2025-03-16

## 2025-02-16 RX ORDER — ONDANSETRON 2 MG/ML
INJECTION INTRAMUSCULAR; INTRAVENOUS
Status: DISCONTINUED | OUTPATIENT
Start: 2025-02-16 | End: 2025-02-16 | Stop reason: SDUPTHER

## 2025-02-16 RX ORDER — NALOXONE HYDROCHLORIDE 0.4 MG/ML
INJECTION, SOLUTION INTRAMUSCULAR; INTRAVENOUS; SUBCUTANEOUS PRN
Status: DISCONTINUED | OUTPATIENT
Start: 2025-02-16 | End: 2025-02-16 | Stop reason: HOSPADM

## 2025-02-16 RX ORDER — KETAMINE HYDROCHLORIDE 100 MG/ML
INJECTION, SOLUTION INTRAMUSCULAR; INTRAVENOUS
Status: DISPENSED
Start: 2025-02-16 | End: 2025-02-16

## 2025-02-16 RX ORDER — HYDROMORPHONE HYDROCHLORIDE 1 MG/ML
0.25 INJECTION, SOLUTION INTRAMUSCULAR; INTRAVENOUS; SUBCUTANEOUS EVERY 5 MIN PRN
Status: DISCONTINUED | OUTPATIENT
Start: 2025-02-16 | End: 2025-02-16 | Stop reason: HOSPADM

## 2025-02-16 RX ORDER — ROPIVACAINE HYDROCHLORIDE 5 MG/ML
30 INJECTION, SOLUTION EPIDURAL; INFILTRATION; PERINEURAL
Status: CANCELLED | OUTPATIENT
Start: 2025-02-16 | End: 2025-02-17

## 2025-02-16 RX ORDER — ESMOLOL HYDROCHLORIDE 10 MG/ML
INJECTION INTRAVENOUS
Status: DISCONTINUED | OUTPATIENT
Start: 2025-02-16 | End: 2025-02-16 | Stop reason: SDUPTHER

## 2025-02-16 RX ORDER — KETAMINE HYDROCHLORIDE 10 MG/ML
INJECTION, SOLUTION INTRAMUSCULAR; INTRAVENOUS DAILY PRN
Status: COMPLETED | OUTPATIENT
Start: 2025-02-16 | End: 2025-02-16

## 2025-02-16 RX ORDER — SCOPOLAMINE 1 MG/3D
1 PATCH, EXTENDED RELEASE TRANSDERMAL
Status: DISCONTINUED | OUTPATIENT
Start: 2025-02-16 | End: 2025-02-18 | Stop reason: HOSPADM

## 2025-02-16 RX ORDER — POLYETHYLENE GLYCOL 3350 17 G/17G
17 POWDER, FOR SOLUTION ORAL DAILY
Status: DISCONTINUED | OUTPATIENT
Start: 2025-02-16 | End: 2025-02-18 | Stop reason: HOSPADM

## 2025-02-16 RX ORDER — DEXAMETHASONE SODIUM PHOSPHATE 10 MG/ML
INJECTION, SOLUTION INTRAMUSCULAR; INTRAVENOUS
Status: DISCONTINUED | OUTPATIENT
Start: 2025-02-16 | End: 2025-02-16 | Stop reason: SDUPTHER

## 2025-02-16 RX ORDER — IOPAMIDOL 755 MG/ML
75 INJECTION, SOLUTION INTRAVASCULAR
Status: COMPLETED | OUTPATIENT
Start: 2025-02-16 | End: 2025-02-16

## 2025-02-16 RX ORDER — MIDAZOLAM HYDROCHLORIDE 2 MG/2ML
1 INJECTION, SOLUTION INTRAMUSCULAR; INTRAVENOUS EVERY 5 MIN PRN
Status: CANCELLED | OUTPATIENT
Start: 2025-02-16

## 2025-02-16 RX ORDER — PROCHLORPERAZINE EDISYLATE 5 MG/ML
5 INJECTION INTRAMUSCULAR; INTRAVENOUS
Status: DISCONTINUED | OUTPATIENT
Start: 2025-02-16 | End: 2025-02-16 | Stop reason: HOSPADM

## 2025-02-16 RX ORDER — OXYCODONE HYDROCHLORIDE 5 MG/1
5 TABLET ORAL EVERY 4 HOURS PRN
Status: DISCONTINUED | OUTPATIENT
Start: 2025-02-16 | End: 2025-02-18 | Stop reason: HOSPADM

## 2025-02-16 RX ORDER — LABETALOL HYDROCHLORIDE 5 MG/ML
5 INJECTION, SOLUTION INTRAVENOUS
Status: DISCONTINUED | OUTPATIENT
Start: 2025-02-16 | End: 2025-02-16 | Stop reason: HOSPADM

## 2025-02-16 RX ORDER — ONDANSETRON 2 MG/ML
4 INJECTION INTRAMUSCULAR; INTRAVENOUS EVERY 6 HOURS PRN
Status: DISCONTINUED | OUTPATIENT
Start: 2025-02-16 | End: 2025-02-18 | Stop reason: HOSPADM

## 2025-02-16 RX ORDER — SODIUM CHLORIDE 0.9 % (FLUSH) 0.9 %
10 SYRINGE (ML) INJECTION PRN
Status: DISCONTINUED | OUTPATIENT
Start: 2025-02-16 | End: 2025-02-18 | Stop reason: HOSPADM

## 2025-02-16 RX ORDER — ROCURONIUM BROMIDE 10 MG/ML
INJECTION, SOLUTION INTRAVENOUS
Status: DISCONTINUED | OUTPATIENT
Start: 2025-02-16 | End: 2025-02-16 | Stop reason: SDUPTHER

## 2025-02-16 RX ORDER — SCOPOLAMINE 1 MG/3D
PATCH, EXTENDED RELEASE TRANSDERMAL
Status: COMPLETED
Start: 2025-02-16 | End: 2025-02-16

## 2025-02-16 RX ORDER — OXYCODONE AND ACETAMINOPHEN 5; 325 MG/1; MG/1
1 TABLET ORAL EVERY 6 HOURS PRN
Qty: 28 TABLET | Refills: 0 | Status: SHIPPED | OUTPATIENT
Start: 2025-02-16 | End: 2025-02-17 | Stop reason: HOSPADM

## 2025-02-16 RX ORDER — GABAPENTIN 300 MG/1
300 CAPSULE ORAL 3 TIMES DAILY
Status: DISCONTINUED | OUTPATIENT
Start: 2025-02-16 | End: 2025-02-18 | Stop reason: HOSPADM

## 2025-02-16 RX ORDER — SODIUM CHLORIDE, SODIUM LACTATE, POTASSIUM CHLORIDE, CALCIUM CHLORIDE 600; 310; 30; 20 MG/100ML; MG/100ML; MG/100ML; MG/100ML
INJECTION, SOLUTION INTRAVENOUS CONTINUOUS
Status: DISCONTINUED | OUTPATIENT
Start: 2025-02-16 | End: 2025-02-17

## 2025-02-16 RX ORDER — MIDAZOLAM HYDROCHLORIDE 1 MG/ML
INJECTION, SOLUTION INTRAMUSCULAR; INTRAVENOUS
Status: COMPLETED
Start: 2025-02-16 | End: 2025-02-16

## 2025-02-16 RX ORDER — ACETAMINOPHEN 325 MG/1
650 TABLET ORAL EVERY 6 HOURS
Status: DISCONTINUED | OUTPATIENT
Start: 2025-02-16 | End: 2025-02-18 | Stop reason: HOSPADM

## 2025-02-16 RX ORDER — SODIUM CHLORIDE 0.9 % (FLUSH) 0.9 %
5-40 SYRINGE (ML) INJECTION EVERY 12 HOURS SCHEDULED
Status: DISCONTINUED | OUTPATIENT
Start: 2025-02-16 | End: 2025-02-18 | Stop reason: HOSPADM

## 2025-02-16 RX ADMIN — GABAPENTIN 300 MG: 300 CAPSULE ORAL at 22:02

## 2025-02-16 RX ADMIN — ACETAMINOPHEN 650 MG: 325 TABLET ORAL at 14:53

## 2025-02-16 RX ADMIN — ACETAMINOPHEN 650 MG: 325 TABLET ORAL at 18:57

## 2025-02-16 RX ADMIN — METHOCARBAMOL 1000 MG: 500 TABLET ORAL at 15:27

## 2025-02-16 RX ADMIN — DEXAMETHASONE SODIUM PHOSPHATE 10 MG: 10 INJECTION, SOLUTION INTRAMUSCULAR; INTRAVENOUS at 10:13

## 2025-02-16 RX ADMIN — Medication 40 MG: at 10:13

## 2025-02-16 RX ADMIN — FENTANYL CITRATE 100 MCG: 50 INJECTION, SOLUTION INTRAMUSCULAR; INTRAVENOUS at 10:13

## 2025-02-16 RX ADMIN — KETAMINE HYDROCHLORIDE 50 MG: 10 INJECTION INTRAMUSCULAR; INTRAVENOUS at 00:13

## 2025-02-16 RX ADMIN — SODIUM CHLORIDE, POTASSIUM CHLORIDE, SODIUM LACTATE AND CALCIUM CHLORIDE: 600; 310; 30; 20 INJECTION, SOLUTION INTRAVENOUS at 14:01

## 2025-02-16 RX ADMIN — ENOXAPARIN SODIUM 30 MG: 100 INJECTION SUBCUTANEOUS at 22:17

## 2025-02-16 RX ADMIN — ROPIVACAINE HYDROCHLORIDE 30 ML: 5 INJECTION EPIDURAL; INFILTRATION; PERINEURAL at 09:35

## 2025-02-16 RX ADMIN — SODIUM CHLORIDE, POTASSIUM CHLORIDE, SODIUM LACTATE AND CALCIUM CHLORIDE: 600; 310; 30; 20 INJECTION, SOLUTION INTRAVENOUS at 02:10

## 2025-02-16 RX ADMIN — SODIUM CHLORIDE, POTASSIUM CHLORIDE, SODIUM LACTATE AND CALCIUM CHLORIDE: 600; 310; 30; 20 INJECTION, SOLUTION INTRAVENOUS at 10:00

## 2025-02-16 RX ADMIN — ONDANSETRON 4 MG: 2 INJECTION, SOLUTION INTRAMUSCULAR; INTRAVENOUS at 10:56

## 2025-02-16 RX ADMIN — METHOCARBAMOL 1000 MG: 500 TABLET ORAL at 22:03

## 2025-02-16 RX ADMIN — OXYCODONE HYDROCHLORIDE 10 MG: 10 TABLET ORAL at 15:26

## 2025-02-16 RX ADMIN — MIDAZOLAM 2 MG: 1 INJECTION INTRAMUSCULAR; INTRAVENOUS at 09:35

## 2025-02-16 RX ADMIN — ACETAMINOPHEN 650 MG: 325 TABLET ORAL at 02:10

## 2025-02-16 RX ADMIN — GABAPENTIN 300 MG: 300 CAPSULE ORAL at 13:49

## 2025-02-16 RX ADMIN — SODIUM CHLORIDE, POTASSIUM CHLORIDE, SODIUM LACTATE AND CALCIUM CHLORIDE: 600; 310; 30; 20 INJECTION, SOLUTION INTRAVENOUS at 22:08

## 2025-02-16 RX ADMIN — LIDOCAINE HYDROCHLORIDE 80 MG: 20 INJECTION, SOLUTION INTRAVENOUS at 10:13

## 2025-02-16 RX ADMIN — ROCURONIUM BROMIDE 50 MG: 10 INJECTION, SOLUTION INTRAVENOUS at 10:13

## 2025-02-16 RX ADMIN — SUGAMMADEX 200 MG: 100 INJECTION, SOLUTION INTRAVENOUS at 11:04

## 2025-02-16 RX ADMIN — METHOCARBAMOL 1000 MG: 500 TABLET ORAL at 13:49

## 2025-02-16 RX ADMIN — PROPOFOL 120 MG: 10 INJECTION, EMULSION INTRAVENOUS at 10:13

## 2025-02-16 RX ADMIN — IOPAMIDOL 75 ML: 755 INJECTION, SOLUTION INTRAVENOUS at 00:59

## 2025-02-16 RX ADMIN — OXYCODONE HYDROCHLORIDE 10 MG: 10 TABLET ORAL at 22:04

## 2025-02-16 RX ADMIN — SCOPOLAMINE 1 PATCH: 1 PATCH TRANSDERMAL at 09:46

## 2025-02-16 RX ADMIN — WATER 1000 MG: 1 INJECTION INTRAMUSCULAR; INTRAVENOUS; SUBCUTANEOUS at 18:58

## 2025-02-16 RX ADMIN — ESMOLOL HYDROCHLORIDE 30 MG: 10 INJECTION, SOLUTION INTRAVENOUS at 10:13

## 2025-02-16 RX ADMIN — CEFAZOLIN 2000 MG: 2 INJECTION, POWDER, FOR SOLUTION INTRAMUSCULAR; INTRAVENOUS at 10:19

## 2025-02-16 ASSESSMENT — PAIN SCALES - GENERAL
PAINLEVEL_OUTOF10: 9
PAINLEVEL_OUTOF10: 8
PAINLEVEL_OUTOF10: 9
PAINLEVEL_OUTOF10: 8
PAINLEVEL_OUTOF10: 9
PAINLEVEL_OUTOF10: 8
PAINLEVEL_OUTOF10: 7
PAINLEVEL_OUTOF10: 9
PAINLEVEL_OUTOF10: 4
PAINLEVEL_OUTOF10: 10
PAINLEVEL_OUTOF10: 7
PAINLEVEL_OUTOF10: 8

## 2025-02-16 ASSESSMENT — PAIN DESCRIPTION - ORIENTATION
ORIENTATION: RIGHT
ORIENTATION: RIGHT;LEFT
ORIENTATION: LEFT;RIGHT

## 2025-02-16 ASSESSMENT — PAIN DESCRIPTION - LOCATION
LOCATION: ANKLE
LOCATION: LEG;RIB CAGE
LOCATION: RIB CAGE;LEG
LOCATION: LEG;RIB CAGE
LOCATION: LEG;RIB CAGE

## 2025-02-16 ASSESSMENT — PAIN - FUNCTIONAL ASSESSMENT
PAIN_FUNCTIONAL_ASSESSMENT: ACTIVITIES ARE NOT PREVENTED
PAIN_FUNCTIONAL_ASSESSMENT: PREVENTS OR INTERFERES SOME ACTIVE ACTIVITIES AND ADLS

## 2025-02-16 ASSESSMENT — PAIN DESCRIPTION - DESCRIPTORS
DESCRIPTORS: ACHING;THROBBING;STABBING
DESCRIPTORS: SHARP;SHOOTING;STABBING
DESCRIPTORS: ACHING;DISCOMFORT
DESCRIPTORS: ACHING;DISCOMFORT;SHARP

## 2025-02-16 ASSESSMENT — PAIN DESCRIPTION - PAIN TYPE: TYPE: SURGICAL PAIN;ACUTE PAIN

## 2025-02-16 ASSESSMENT — PAIN DESCRIPTION - FREQUENCY: FREQUENCY: INTERMITTENT

## 2025-02-16 ASSESSMENT — PAIN DESCRIPTION - ONSET: ONSET: GRADUAL

## 2025-02-16 NOTE — ED NOTES
Left sided chest wall tenderness. Small abrasion to left hand.    Pain to the right ankle. Deformity to the right ankle

## 2025-02-16 NOTE — PLAN OF CARE
Problem: Discharge Planning  Goal: Discharge to home or other facility with appropriate resources  Outcome: Progressing     Problem: Safety - Adult  Goal: Free from fall injury  Outcome: Progressing     Problem: ABCDS Injury Assessment  Goal: Absence of physical injury  Outcome: Progressing     Problem: Skin/Tissue Integrity  Goal: Skin integrity remains intact  Description: 1.  Monitor for areas of redness and/or skin breakdown  2.  Assess vascular access sites hourly  3.  Every 4-6 hours minimum:  Change oxygen saturation probe site  4.  Every 4-6 hours:  If on nasal continuous positive airway pressure, respiratory therapy assess nares and determine need for appliance change or resting period  Outcome: Progressing
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
## 2025-02-16 NOTE — DISCHARGE INSTRUCTIONS
is a key part of your treatment and safety. Be sure to make and go to all appointments, and call your doctor if you are having problems. It's also a good idea to know your test results and keep a list of the medicines you take.  When should you call for help?   Call 911  anytime you think you may need emergency care. For example, call if:    You passed out (lost consciousness).     You have chest pain, are short of breath, or cough up blood.   Call your doctor now or seek immediate medical care if:    You have symptoms of infection, such as:  Increased pain, swelling, warmth, or redness.  Red streaks leading from any incision or pin site.  Pus draining from any incision or pin site.  A fever.     You have tingling, weakness, or numbness around the pin area.     Your splint, if you have one, feels too tight.     You have signs of a blood clot in the leg (called a deep vein thrombosis), such as:  Pain in your calf, back of the knee, thigh, or groin.  Swelling in the leg or groin.  A color change on the leg or groin. The skin may be reddish or purplish, depending on your usual skin color.   Watch closely for changes in your health, and be sure to contact your doctor if:    You have new or increased pain.     You notice that the pin or any part of the fixator seems loose or out of place.     There is bleeding around a pin site that won't stop.     You also have a splint and there's drainage or a bad smell coming from it.   Where can you learn more?  Go to https://[email or web address].net/patientEd and enter W533 to learn more about \"Pin Care: What to Expect at Home.\"  Current as of: July 31, 2024  Content Version: 14.3  © 2024 [employer].   Care instructions adapted under license by [employer]. If you have questions about a medical condition or this instruction, always ask your healthcare professional. [employer] disclaims any warranty or liability for your use of this information.

## 2025-02-16 NOTE — PROCEDURES
PROCEDURE NOTE  Date: 2/16/2025   Name: Aileen Raman  YOB: 1990    CENTRAL LINE    Date/Time: 2/16/2025 12:26 AM    Performed by: Blanca lGoria MD  Authorized by: Omaira Major DO    Consent:     Consent obtained:  Verbal    Consent given by:  Patient  Universal protocol:     Patient identity confirmed:  Verbally with patient  Pre-procedure details:     Indication(s): insufficient peripheral access      Hand hygiene: Hand hygiene performed prior to insertion      Sterile barrier technique: All elements of maximal sterile technique followed      Skin preparation:  Chlorhexidine    Skin preparation agent: Skin preparation agent completely dried prior to procedure    Sedation:     Sedation type:  None  Anesthesia:     Anesthesia method:  Local infiltration  Procedure details:     Location:  L femoral    Patient position:  Supine    Procedural supplies:  Triple lumen    Catheter size:  7 Fr    Landmarks identified: yes      Ultrasound guidance: yes      Ultrasound guidance timing: real time      Sterile ultrasound techniques: Sterile gel and sterile probe covers were used      Number of attempts:  1    Successful placement: yes    Post-procedure details:     Post-procedure:  Dressing applied and line sutured    Assessment:  Blood return through all ports and free fluid flow    Procedure completion:  Tolerated  Comments:      Indication: unable to obtain PIV in trauma bay  Dr. Tran was available

## 2025-02-16 NOTE — OP NOTE
Operative Note      Patient: Aileen Raman  YOB: 1990  MRN: [id number]    Date of Procedure: 2/16/2025    Preoperative diagnosis:  1.  Right subtalar dislocation    2.  Right lateral process of the talus and medial facet of the talus fractures    3.  Right base of the fourth metatarsal fracture    Post-Op Diagnosis: Same       Procedure:  1.  Application of multiplanar external fixator right lower extremity    2.  Closed treatment of right subtalar dislocation requiring anesthesia    3.  Closed treatment right fourth metatarsal fracture manipulation    4.  Stress examination of the patient's midfoot under anesthesia for potential Lisfranc injury    Surgeon(s):  Jim Mcmullen MD    Assistant:   Resident: Vikas Chang DO; Bob Barker DO    Anesthesia: General    Estimated Blood Loss (mL): Minimal    Complications: None    Specimens:   * No specimens in log *    Implants:  * No implants in log *      Drains: * No LDAs found *    Findings:  Infection Present At Time Of Surgery (PATOS) (choose all levels that have infection present):  No infection present  Other Findings: Midfoot with stable on examination, patient has chronic osteophytes of the dorsal talus and navicular bones, subtalar joint was grossly stable on examination, patient does have fractures in the subtalar joint noted on fluoroscopy    Detailed Description of Procedure:   Patient is brought to the operative room in a supine position on hospital bed.  Patient was transferred to the operating room table by multiple individuals in a safe fashion with anesthesia and controlled patient C-spine area.  Once in the operating room table placed pressure identified well-padded.  A tourniquet is applied to right upper thigh.  Right lower extremity sterilely prepped and draped in a standard orthopedic fashion.  Timeouts performed and came the appropriate indication of the patient, the procedure to be performed, and side be

## 2025-02-17 PROBLEM — S82.891A: Status: ACTIVE | Noted: 2025-02-17

## 2025-02-17 PROBLEM — S20.212A CONTUSION OF LEFT CHEST WALL: Status: ACTIVE | Noted: 2025-02-17

## 2025-02-17 LAB
ANION GAP SERPL CALCULATED.3IONS-SCNC: 9 MMOL/L (ref 7–16)
BASOPHILS # BLD: 0.01 K/UL (ref 0–0.2)
BASOPHILS NFR BLD: 0 % (ref 0–2)
BUN SERPL-MCNC: 9 MG/DL (ref 6–20)
CALCIUM SERPL-MCNC: 8.5 MG/DL (ref 8.6–10.2)
CHLORIDE SERPL-SCNC: 107 MMOL/L (ref 98–107)
CO2 SERPL-SCNC: 26 MMOL/L (ref 22–29)
CREAT SERPL-MCNC: 0.6 MG/DL (ref 0.5–1)
EOSINOPHIL # BLD: 0.01 K/UL (ref 0.05–0.5)
EOSINOPHILS RELATIVE PERCENT: 0 % (ref 0–6)
ERYTHROCYTE [DISTWIDTH] IN BLOOD BY AUTOMATED COUNT: 13.2 % (ref 11.5–15)
GFR, ESTIMATED: >90 ML/MIN/1.73M2
GLUCOSE SERPL-MCNC: 166 MG/DL (ref 74–99)
HCT VFR BLD AUTO: 28.7 % (ref 34–48)
HGB BLD-MCNC: 9.5 G/DL (ref 11.5–15.5)
IMM GRANULOCYTES # BLD AUTO: 0.04 K/UL (ref 0–0.58)
IMM GRANULOCYTES NFR BLD: 0 % (ref 0–5)
LYMPHOCYTES NFR BLD: 1.83 K/UL (ref 1.5–4)
LYMPHOCYTES RELATIVE PERCENT: 15 % (ref 20–42)
MCH RBC QN AUTO: 28.9 PG (ref 26–35)
MCHC RBC AUTO-ENTMCNC: 33.1 G/DL (ref 32–34.5)
MCV RBC AUTO: 87.2 FL (ref 80–99.9)
MONOCYTES NFR BLD: 0.75 K/UL (ref 0.1–0.95)
MONOCYTES NFR BLD: 6 % (ref 2–12)
NEUTROPHILS NFR BLD: 78 % (ref 43–80)
NEUTS SEG NFR BLD: 9.56 K/UL (ref 1.8–7.3)
PLATELET # BLD AUTO: 166 K/UL (ref 130–450)
PMV BLD AUTO: 11.2 FL (ref 7–12)
POTASSIUM SERPL-SCNC: 3.7 MMOL/L (ref 3.5–5)
RBC # BLD AUTO: 3.29 M/UL (ref 3.5–5.5)
SODIUM SERPL-SCNC: 142 MMOL/L (ref 132–146)
WBC OTHER # BLD: 12.2 K/UL (ref 4.5–11.5)

## 2025-02-17 PROCEDURE — 80048 BASIC METABOLIC PNL TOTAL CA: CPT

## 2025-02-17 PROCEDURE — 97530 THERAPEUTIC ACTIVITIES: CPT

## 2025-02-17 PROCEDURE — 97161 PT EVAL LOW COMPLEX 20 MIN: CPT

## 2025-02-17 PROCEDURE — 6370000000 HC RX 637 (ALT 250 FOR IP)

## 2025-02-17 PROCEDURE — 6360000002 HC RX W HCPCS

## 2025-02-17 PROCEDURE — 97535 SELF CARE MNGMENT TRAINING: CPT

## 2025-02-17 PROCEDURE — 97165 OT EVAL LOW COMPLEX 30 MIN: CPT

## 2025-02-17 PROCEDURE — 2700000000 HC OXYGEN THERAPY PER DAY

## 2025-02-17 PROCEDURE — 1200000000 HC SEMI PRIVATE

## 2025-02-17 PROCEDURE — 2580000003 HC RX 258

## 2025-02-17 PROCEDURE — 36415 COLL VENOUS BLD VENIPUNCTURE: CPT

## 2025-02-17 PROCEDURE — 2500000003 HC RX 250 WO HCPCS

## 2025-02-17 PROCEDURE — 76937 US GUIDE VASCULAR ACCESS: CPT

## 2025-02-17 PROCEDURE — 85025 COMPLETE CBC W/AUTO DIFF WBC: CPT

## 2025-02-17 RX ADMIN — METHOCARBAMOL 1000 MG: 500 TABLET ORAL at 12:01

## 2025-02-17 RX ADMIN — OXYCODONE HYDROCHLORIDE 10 MG: 10 TABLET ORAL at 06:48

## 2025-02-17 RX ADMIN — SODIUM CHLORIDE, POTASSIUM CHLORIDE, SODIUM LACTATE AND CALCIUM CHLORIDE: 600; 310; 30; 20 INJECTION, SOLUTION INTRAVENOUS at 07:22

## 2025-02-17 RX ADMIN — GABAPENTIN 300 MG: 300 CAPSULE ORAL at 14:35

## 2025-02-17 RX ADMIN — METHOCARBAMOL 1000 MG: 500 TABLET ORAL at 18:45

## 2025-02-17 RX ADMIN — ACETAMINOPHEN 650 MG: 325 TABLET ORAL at 02:15

## 2025-02-17 RX ADMIN — SODIUM CHLORIDE, PRESERVATIVE FREE 10 ML: 5 INJECTION INTRAVENOUS at 09:10

## 2025-02-17 RX ADMIN — OXYCODONE HYDROCHLORIDE 10 MG: 10 TABLET ORAL at 18:45

## 2025-02-17 RX ADMIN — SODIUM CHLORIDE, PRESERVATIVE FREE 10 ML: 5 INJECTION INTRAVENOUS at 21:55

## 2025-02-17 RX ADMIN — METHOCARBAMOL 1000 MG: 500 TABLET ORAL at 21:54

## 2025-02-17 RX ADMIN — ENOXAPARIN SODIUM 30 MG: 100 INJECTION SUBCUTANEOUS at 21:54

## 2025-02-17 RX ADMIN — GABAPENTIN 300 MG: 300 CAPSULE ORAL at 09:10

## 2025-02-17 RX ADMIN — GABAPENTIN 300 MG: 300 CAPSULE ORAL at 21:55

## 2025-02-17 RX ADMIN — WATER 1000 MG: 1 INJECTION INTRAMUSCULAR; INTRAVENOUS; SUBCUTANEOUS at 02:12

## 2025-02-17 RX ADMIN — ACETAMINOPHEN 650 MG: 325 TABLET ORAL at 18:46

## 2025-02-17 RX ADMIN — METHOCARBAMOL 1000 MG: 500 TABLET ORAL at 09:09

## 2025-02-17 RX ADMIN — ACETAMINOPHEN 650 MG: 325 TABLET ORAL at 12:01

## 2025-02-17 RX ADMIN — POLYETHYLENE GLYCOL 3350 17 G: 17 POWDER, FOR SOLUTION ORAL at 09:09

## 2025-02-17 RX ADMIN — OXYCODONE HYDROCHLORIDE 10 MG: 10 TABLET ORAL at 12:00

## 2025-02-17 RX ADMIN — ACETAMINOPHEN 650 MG: 325 TABLET ORAL at 06:50

## 2025-02-17 RX ADMIN — ENOXAPARIN SODIUM 30 MG: 100 INJECTION SUBCUTANEOUS at 09:10

## 2025-02-17 ASSESSMENT — PAIN DESCRIPTION - PAIN TYPE
TYPE: ACUTE PAIN;SURGICAL PAIN
TYPE: SURGICAL PAIN
TYPE: ACUTE PAIN;SURGICAL PAIN

## 2025-02-17 ASSESSMENT — PAIN SCALES - GENERAL
PAINLEVEL_OUTOF10: 10
PAINLEVEL_OUTOF10: 9
PAINLEVEL_OUTOF10: 9
PAINLEVEL_OUTOF10: 4
PAINLEVEL_OUTOF10: 5
PAINLEVEL_OUTOF10: 10
PAINLEVEL_OUTOF10: 6
PAINLEVEL_OUTOF10: 6
PAINLEVEL_OUTOF10: 4
PAINLEVEL_OUTOF10: 9

## 2025-02-17 ASSESSMENT — PAIN DESCRIPTION - LOCATION
LOCATION: LEG;ANKLE
LOCATION: LEG;ANKLE
LOCATION: RIB CAGE;ANKLE
LOCATION: ANKLE;CHEST
LOCATION: ANKLE;RIB CAGE
LOCATION: ANKLE;RIB CAGE;LEG

## 2025-02-17 ASSESSMENT — PAIN - FUNCTIONAL ASSESSMENT
PAIN_FUNCTIONAL_ASSESSMENT: PREVENTS OR INTERFERES SOME ACTIVE ACTIVITIES AND ADLS
PAIN_FUNCTIONAL_ASSESSMENT: PREVENTS OR INTERFERES SOME ACTIVE ACTIVITIES AND ADLS

## 2025-02-17 ASSESSMENT — PAIN DESCRIPTION - DESCRIPTORS
DESCRIPTORS: ACHING;THROBBING;STABBING
DESCRIPTORS: SHOOTING;STABBING;SHARP;THROBBING
DESCRIPTORS: SHARP;STABBING;ACHING
DESCRIPTORS: ACHING;THROBBING;STABBING
DESCRIPTORS: THROBBING;ACHING;CRUSHING

## 2025-02-17 ASSESSMENT — PAIN DESCRIPTION - ORIENTATION
ORIENTATION: RIGHT;LOWER
ORIENTATION: RIGHT;MID;LOWER
ORIENTATION: RIGHT;LEFT
ORIENTATION: RIGHT;LOWER;MID
ORIENTATION: RIGHT
ORIENTATION: RIGHT;LEFT

## 2025-02-17 ASSESSMENT — PAIN DESCRIPTION - ONSET
ONSET: GRADUAL
ONSET: GRADUAL

## 2025-02-17 ASSESSMENT — PAIN DESCRIPTION - FREQUENCY
FREQUENCY: INTERMITTENT
FREQUENCY: INTERMITTENT

## 2025-02-17 NOTE — PLAN OF CARE
Problem: Discharge Planning  Goal: Discharge to home or other facility with appropriate resources  2/17/2025 0939 by Val Archer, RN  Outcome: Progressing     Problem: Safety - Adult  Goal: Free from fall injury  2/17/2025 0939 by Val Archer, RN  Outcome: Progressing     Problem: ABCDS Injury Assessment  Goal: Absence of physical injury  2/17/2025 0939 by Val Archer, RN  Outcome: Progressing     Problem: Skin/Tissue Integrity  Goal: Skin integrity remains intact  Description: 1.  Monitor for areas of redness and/or skin breakdown  2.  Assess vascular access sites hourly  3.  Every 4-6 hours minimum:  Change oxygen saturation probe site  4.  Every 4-6 hours:  If on nasal continuous positive airway pressure, respiratory therapy assess nares and determine need for appliance change or resting period  2/17/2025 0939 by Val Archer, RN  Outcome: Progressing     Problem: Pain  Goal: Verbalizes/displays adequate comfort level or baseline comfort level  Outcome: Progressing     Problem: Chronic Conditions and Co-morbidities  Goal: Patient's chronic conditions and co-morbidity symptoms are monitored and maintained or improved  Outcome: Progressing     Problem: Nutrition Deficit:  Goal: Optimize nutritional status  Outcome: Progressing
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
## 2025-02-17 NOTE — PLAN OF CARE
Problem: Discharge Planning  Goal: Discharge to home or other facility with appropriate resources  2/16/2025 2339 by Elizabeth Ramirez RN  Outcome: Progressing  2/16/2025 1659 by Erma Ellis RN  Outcome: Progressing     Problem: Safety - Adult  Goal: Free from fall injury  2/16/2025 2339 by Elizabeth Ramirez RN  Outcome: Progressing  2/16/2025 1659 by Erma Ellis RN  Outcome: Progressing     Problem: ABCDS Injury Assessment  Goal: Absence of physical injury  2/16/2025 2339 by Elizabeth Ramirez RN  Outcome: Progressing  2/16/2025 1659 by Erma Ellis RN  Outcome: Progressing     Problem: Skin/Tissue Integrity  Goal: Skin integrity remains intact  Description: 1.  Monitor for areas of redness and/or skin breakdown  2.  Assess vascular access sites hourly  3.  Every 4-6 hours minimum:  Change oxygen saturation probe site  4.  Every 4-6 hours:  If on nasal continuous positive airway pressure, respiratory therapy assess nares and determine need for appliance change or resting period  2/16/2025 2339 by Elizabeth Ramirez RN  Outcome: Progressing  2/16/2025 1659 by Erma Ellis RN  Outcome: Progressing

## 2025-02-18 VITALS
RESPIRATION RATE: 16 BRPM | OXYGEN SATURATION: 93 % | HEIGHT: 66 IN | WEIGHT: 180 LBS | DIASTOLIC BLOOD PRESSURE: 65 MMHG | TEMPERATURE: 97.8 F | SYSTOLIC BLOOD PRESSURE: 108 MMHG | HEART RATE: 53 BPM | BODY MASS INDEX: 28.93 KG/M2

## 2025-02-18 LAB
ANION GAP SERPL CALCULATED.3IONS-SCNC: 9 MMOL/L (ref 7–16)
BASOPHILS # BLD: 0.05 K/UL (ref 0–0.2)
BASOPHILS NFR BLD: 1 % (ref 0–2)
BUN SERPL-MCNC: 9 MG/DL (ref 6–20)
CALCIUM SERPL-MCNC: 8 MG/DL (ref 8.6–10.2)
CHLORIDE SERPL-SCNC: 106 MMOL/L (ref 98–107)
CO2 SERPL-SCNC: 22 MMOL/L (ref 22–29)
CREAT SERPL-MCNC: 0.6 MG/DL (ref 0.5–1)
EOSINOPHIL # BLD: 0.07 K/UL (ref 0.05–0.5)
EOSINOPHILS RELATIVE PERCENT: 1 % (ref 0–6)
ERYTHROCYTE [DISTWIDTH] IN BLOOD BY AUTOMATED COUNT: 13.7 % (ref 11.5–15)
GFR, ESTIMATED: >90 ML/MIN/1.73M2
GLUCOSE SERPL-MCNC: 85 MG/DL (ref 74–99)
HCT VFR BLD AUTO: 31.4 % (ref 34–48)
HGB BLD-MCNC: 9.4 G/DL (ref 11.5–15.5)
IMM GRANULOCYTES # BLD AUTO: 0.06 K/UL (ref 0–0.58)
IMM GRANULOCYTES NFR BLD: 1 % (ref 0–5)
LYMPHOCYTES NFR BLD: 3.33 K/UL (ref 1.5–4)
LYMPHOCYTES RELATIVE PERCENT: 43 % (ref 20–42)
MCH RBC QN AUTO: 28.8 PG (ref 26–35)
MCHC RBC AUTO-ENTMCNC: 29.9 G/DL (ref 32–34.5)
MCV RBC AUTO: 96.3 FL (ref 80–99.9)
MONOCYTES NFR BLD: 0.42 K/UL (ref 0.1–0.95)
MONOCYTES NFR BLD: 6 % (ref 2–12)
NEUTROPHILS NFR BLD: 49 % (ref 43–80)
NEUTS SEG NFR BLD: 3.74 K/UL (ref 1.8–7.3)
PLATELET # BLD AUTO: 117 K/UL (ref 130–450)
PMV BLD AUTO: 11.9 FL (ref 7–12)
POTASSIUM SERPL-SCNC: 3.8 MMOL/L (ref 3.5–5)
RBC # BLD AUTO: 3.26 M/UL (ref 3.5–5.5)
SODIUM SERPL-SCNC: 137 MMOL/L (ref 132–146)
WBC OTHER # BLD: 7.7 K/UL (ref 4.5–11.5)

## 2025-02-18 PROCEDURE — 85025 COMPLETE CBC W/AUTO DIFF WBC: CPT

## 2025-02-18 PROCEDURE — 36415 COLL VENOUS BLD VENIPUNCTURE: CPT

## 2025-02-18 PROCEDURE — 97530 THERAPEUTIC ACTIVITIES: CPT

## 2025-02-18 PROCEDURE — 97535 SELF CARE MNGMENT TRAINING: CPT

## 2025-02-18 PROCEDURE — 6370000000 HC RX 637 (ALT 250 FOR IP)

## 2025-02-18 PROCEDURE — 2500000003 HC RX 250 WO HCPCS

## 2025-02-18 PROCEDURE — 6360000002 HC RX W HCPCS

## 2025-02-18 PROCEDURE — 80048 BASIC METABOLIC PNL TOTAL CA: CPT

## 2025-02-18 RX ORDER — SENNA AND DOCUSATE SODIUM 50; 8.6 MG/1; MG/1
2 TABLET, FILM COATED ORAL DAILY
Status: DISCONTINUED | OUTPATIENT
Start: 2025-02-18 | End: 2025-02-18 | Stop reason: HOSPADM

## 2025-02-18 RX ORDER — POLYETHYLENE GLYCOL 3350 17 G/17G
17 POWDER, FOR SOLUTION ORAL DAILY
Qty: 30 PACKET | Refills: 0 | Status: SHIPPED | OUTPATIENT
Start: 2025-02-19 | End: 2025-03-21

## 2025-02-18 RX ORDER — BISACODYL 10 MG
10 SUPPOSITORY, RECTAL RECTAL DAILY
Status: DISCONTINUED | OUTPATIENT
Start: 2025-02-18 | End: 2025-02-18 | Stop reason: HOSPADM

## 2025-02-18 RX ORDER — OXYCODONE HYDROCHLORIDE 10 MG/1
10 TABLET ORAL EVERY 8 HOURS PRN
Qty: 15 TABLET | Refills: 0 | Status: SHIPPED | OUTPATIENT
Start: 2025-02-18 | End: 2025-02-23

## 2025-02-18 RX ORDER — BISACODYL 10 MG
10 SUPPOSITORY, RECTAL RECTAL DAILY PRN
Status: DISCONTINUED | OUTPATIENT
Start: 2025-02-18 | End: 2025-02-18

## 2025-02-18 RX ORDER — GABAPENTIN 300 MG/1
300 CAPSULE ORAL 3 TIMES DAILY
Qty: 30 CAPSULE | Refills: 0 | Status: SHIPPED | OUTPATIENT
Start: 2025-02-18 | End: 2025-02-28

## 2025-02-18 RX ORDER — SENNA AND DOCUSATE SODIUM 50; 8.6 MG/1; MG/1
2 TABLET, FILM COATED ORAL DAILY PRN
Status: DISCONTINUED | OUTPATIENT
Start: 2025-02-18 | End: 2025-02-18

## 2025-02-18 RX ORDER — METHOCARBAMOL 1000 MG/1
1000 TABLET, FILM COATED ORAL 4 TIMES DAILY
Qty: 40 TABLET | Refills: 0 | Status: SHIPPED | OUTPATIENT
Start: 2025-02-18 | End: 2025-02-28

## 2025-02-18 RX ADMIN — OXYCODONE HYDROCHLORIDE 10 MG: 10 TABLET ORAL at 08:08

## 2025-02-18 RX ADMIN — SENNOSIDES AND DOCUSATE SODIUM 2 TABLET: 50; 8.6 TABLET ORAL at 08:08

## 2025-02-18 RX ADMIN — ACETAMINOPHEN 650 MG: 325 TABLET ORAL at 13:47

## 2025-02-18 RX ADMIN — METHOCARBAMOL 1000 MG: 500 TABLET ORAL at 13:47

## 2025-02-18 RX ADMIN — ACETAMINOPHEN 650 MG: 325 TABLET ORAL at 08:09

## 2025-02-18 RX ADMIN — ENOXAPARIN SODIUM 30 MG: 100 INJECTION SUBCUTANEOUS at 08:08

## 2025-02-18 RX ADMIN — GABAPENTIN 300 MG: 300 CAPSULE ORAL at 08:09

## 2025-02-18 RX ADMIN — METHOCARBAMOL 1000 MG: 500 TABLET ORAL at 08:06

## 2025-02-18 RX ADMIN — POLYETHYLENE GLYCOL 3350 17 G: 17 POWDER, FOR SOLUTION ORAL at 08:06

## 2025-02-18 RX ADMIN — SODIUM CHLORIDE, PRESERVATIVE FREE 10 ML: 5 INJECTION INTRAVENOUS at 08:10

## 2025-02-18 RX ADMIN — OXYCODONE HYDROCHLORIDE 10 MG: 10 TABLET ORAL at 17:56

## 2025-02-18 RX ADMIN — METHOCARBAMOL 1000 MG: 500 TABLET ORAL at 17:59

## 2025-02-18 RX ADMIN — OXYCODONE HYDROCHLORIDE 10 MG: 10 TABLET ORAL at 02:00

## 2025-02-18 RX ADMIN — ACETAMINOPHEN 650 MG: 325 TABLET ORAL at 18:03

## 2025-02-18 RX ADMIN — GABAPENTIN 300 MG: 300 CAPSULE ORAL at 13:48

## 2025-02-18 RX ADMIN — ACETAMINOPHEN 650 MG: 325 TABLET ORAL at 01:59

## 2025-02-18 ASSESSMENT — PAIN DESCRIPTION - PAIN TYPE: TYPE: SURGICAL PAIN

## 2025-02-18 ASSESSMENT — PAIN DESCRIPTION - LOCATION
LOCATION: LEG
LOCATION: LEG;RIB CAGE
LOCATION: ANKLE;LEG
LOCATION: LEG
LOCATION: LEG

## 2025-02-18 ASSESSMENT — PAIN SCALES - GENERAL
PAINLEVEL_OUTOF10: 6
PAINLEVEL_OUTOF10: 4
PAINLEVEL_OUTOF10: 10
PAINLEVEL_OUTOF10: 7
PAINLEVEL_OUTOF10: 8
PAINLEVEL_OUTOF10: 8

## 2025-02-18 ASSESSMENT — PAIN DESCRIPTION - ORIENTATION
ORIENTATION: RIGHT;LEFT
ORIENTATION: RIGHT

## 2025-02-18 ASSESSMENT — PAIN DESCRIPTION - DESCRIPTORS
DESCRIPTORS: ACHING;DISCOMFORT;SHARP
DESCRIPTORS: ACHING;DISCOMFORT;THROBBING
DESCRIPTORS: ACHING;DISCOMFORT;SORE;THROBBING
DESCRIPTORS: ACHING;DISCOMFORT
DESCRIPTORS: ACHING;DISCOMFORT;SHARP

## 2025-02-18 ASSESSMENT — PAIN - FUNCTIONAL ASSESSMENT
PAIN_FUNCTIONAL_ASSESSMENT: PREVENTS OR INTERFERES SOME ACTIVE ACTIVITIES AND ADLS

## 2025-02-18 NOTE — CARE COORDINATION
02/18/24 Update CM Note: Patient is POD 2 Ex fix application to ankle. Site is bleeding and to checked by ortho resident today. She is NWB. She is cleared for discharge per orthopedics. Perfect serve sent to trauma to check on discharge. Plan is for patient to return home with her mother. [facility] given referral for wheeled walker for home use. Will follow for discharge order and needs at discharge. Electronically signed by Maria E Vazquez RN CM on 2/18/2025 at 9:50 AM

## 2025-02-18 NOTE — PLAN OF CARE
Problem: Discharge Planning  Goal: Discharge to home or other facility with appropriate resources  Outcome: Adequate for Discharge     Problem: Safety - Adult  Goal: Free from fall injury  Outcome: Adequate for Discharge     Problem: ABCDS Injury Assessment  Goal: Absence of physical injury  Outcome: Adequate for Discharge     Problem: Skin/Tissue Integrity  Goal: Skin integrity remains intact  Description: 1.  Monitor for areas of redness and/or skin breakdown  2.  Assess vascular access sites hourly  3.  Every 4-6 hours minimum:  Change oxygen saturation probe site  4.  Every 4-6 hours:  If on nasal continuous positive airway pressure, respiratory therapy assess nares and determine need for appliance change or resting period  Outcome: Adequate for Discharge     Problem: Pain  Goal: Verbalizes/displays adequate comfort level or baseline comfort level  Outcome: Adequate for Discharge     Problem: Chronic Conditions and Co-morbidities  Goal: Patient's chronic conditions and co-morbidity symptoms are monitored and maintained or improved  Outcome: Adequate for Discharge     Problem: Nutrition Deficit:  Goal: Optimize nutritional status  Outcome: Adequate for Discharge
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
## 2025-02-18 NOTE — PLAN OF CARE
Problem: Discharge Planning  Goal: Discharge to home or other facility with appropriate resources  Outcome: Progressing     Problem: Safety - Adult  Goal: Free from fall injury  Outcome: Progressing     Problem: ABCDS Injury Assessment  Goal: Absence of physical injury  Outcome: Progressing  Flowsheets (Taken 2/18/2025 0037)  Absence of Physical Injury: Implement safety measures based on patient assessment

## 2025-02-20 ENCOUNTER — HOSPITAL ENCOUNTER (EMERGENCY)
Age: 35
Discharge: HOME | End: 2025-02-21
Attending: EMERGENCY MEDICINE
Payer: OTHER MISCELLANEOUS

## 2025-02-20 DIAGNOSIS — R20.0 NUMBNESS OF RIGHT FOOT: ICD-10-CM

## 2025-02-20 DIAGNOSIS — Z98.890 POST-OPERATIVE STATE: ICD-10-CM

## 2025-02-20 DIAGNOSIS — M79.661 PAIN OF RIGHT LOWER LEG: Primary | ICD-10-CM

## 2025-02-20 PROCEDURE — 80053 COMPREHEN METABOLIC PANEL: CPT

## 2025-02-20 PROCEDURE — 6360000002 HC RX W HCPCS: Performed by: EMERGENCY MEDICINE

## 2025-02-20 PROCEDURE — 96374 THER/PROPH/DIAG INJ IV PUSH: CPT

## 2025-02-20 PROCEDURE — 96375 TX/PRO/DX INJ NEW DRUG ADDON: CPT

## 2025-02-20 PROCEDURE — 2580000003 HC RX 258: Performed by: EMERGENCY MEDICINE

## 2025-02-20 PROCEDURE — 6370000000 HC RX 637 (ALT 250 FOR IP): Performed by: EMERGENCY MEDICINE

## 2025-02-20 PROCEDURE — 99284 EMERGENCY DEPT VISIT MOD MDM: CPT

## 2025-02-20 PROCEDURE — 2500000003 HC RX 250 WO HCPCS: Performed by: EMERGENCY MEDICINE

## 2025-02-20 PROCEDURE — 83735 ASSAY OF MAGNESIUM: CPT

## 2025-02-20 PROCEDURE — 82550 ASSAY OF CK (CPK): CPT

## 2025-02-20 RX ORDER — 0.9 % SODIUM CHLORIDE 0.9 %
500 INTRAVENOUS SOLUTION INTRAVENOUS ONCE
Status: COMPLETED | OUTPATIENT
Start: 2025-02-20 | End: 2025-02-21

## 2025-02-20 RX ORDER — OXYCODONE HYDROCHLORIDE 5 MG/1
10 TABLET ORAL ONCE
Status: COMPLETED | OUTPATIENT
Start: 2025-02-20 | End: 2025-02-20

## 2025-02-20 RX ORDER — HYDROMORPHONE HYDROCHLORIDE 1 MG/ML
1 INJECTION, SOLUTION INTRAMUSCULAR; INTRAVENOUS; SUBCUTANEOUS ONCE
Status: DISCONTINUED | OUTPATIENT
Start: 2025-02-20 | End: 2025-02-20

## 2025-02-20 RX ORDER — DIPHENHYDRAMINE HYDROCHLORIDE 50 MG/ML
25 INJECTION INTRAMUSCULAR; INTRAVENOUS ONCE
Status: COMPLETED | OUTPATIENT
Start: 2025-02-20 | End: 2025-02-20

## 2025-02-20 RX ORDER — DIPHENHYDRAMINE HYDROCHLORIDE 50 MG/ML
25 INJECTION INTRAMUSCULAR; INTRAVENOUS ONCE
Status: DISCONTINUED | OUTPATIENT
Start: 2025-02-20 | End: 2025-02-20

## 2025-02-20 RX ORDER — HYDROMORPHONE HYDROCHLORIDE 1 MG/ML
1 INJECTION, SOLUTION INTRAMUSCULAR; INTRAVENOUS; SUBCUTANEOUS ONCE
Status: COMPLETED | OUTPATIENT
Start: 2025-02-20 | End: 2025-02-20

## 2025-02-20 RX ORDER — ORPHENADRINE CITRATE 30 MG/ML
60 INJECTION INTRAMUSCULAR; INTRAVENOUS ONCE
Status: DISCONTINUED | OUTPATIENT
Start: 2025-02-20 | End: 2025-02-20

## 2025-02-20 RX ORDER — ORPHENADRINE CITRATE 30 MG/ML
60 INJECTION INTRAMUSCULAR; INTRAVENOUS ONCE
Status: COMPLETED | OUTPATIENT
Start: 2025-02-20 | End: 2025-02-20

## 2025-02-20 RX ADMIN — HYDROMORPHONE HYDROCHLORIDE 1 MG: 1 INJECTION, SOLUTION INTRAMUSCULAR; INTRAVENOUS; SUBCUTANEOUS at 23:41

## 2025-02-20 RX ADMIN — OXYCODONE 10 MG: 5 TABLET ORAL at 23:41

## 2025-02-20 RX ADMIN — ORPHENADRINE CITRATE 60 MG: 30 INJECTION INTRAMUSCULAR; INTRAVENOUS at 23:40

## 2025-02-20 RX ADMIN — SODIUM CHLORIDE 500 ML: 0.9 INJECTION, SOLUTION INTRAVENOUS at 23:38

## 2025-02-20 RX ADMIN — DIPHENHYDRAMINE HYDROCHLORIDE 25 MG: 50 INJECTION INTRAMUSCULAR; INTRAVENOUS at 23:42

## 2025-02-20 ASSESSMENT — PAIN DESCRIPTION - LOCATION
LOCATION: FOOT
LOCATION: FOOT

## 2025-02-20 ASSESSMENT — PAIN SCALES - GENERAL
PAINLEVEL_OUTOF10: 10
PAINLEVEL_OUTOF10: 10

## 2025-02-20 ASSESSMENT — PAIN DESCRIPTION - DESCRIPTORS
DESCRIPTORS: DISCOMFORT;ACHING;SHARP
DESCRIPTORS: SHARP;DISCOMFORT;ACHING

## 2025-02-20 ASSESSMENT — PAIN DESCRIPTION - ORIENTATION
ORIENTATION: RIGHT
ORIENTATION: RIGHT

## 2025-02-21 ENCOUNTER — APPOINTMENT (OUTPATIENT)
Dept: GENERAL RADIOLOGY | Age: 35
End: 2025-02-21
Payer: OTHER MISCELLANEOUS

## 2025-02-21 VITALS
RESPIRATION RATE: 17 BRPM | OXYGEN SATURATION: 98 % | DIASTOLIC BLOOD PRESSURE: 81 MMHG | SYSTOLIC BLOOD PRESSURE: 119 MMHG | HEART RATE: 84 BPM | TEMPERATURE: 98 F

## 2025-02-21 DIAGNOSIS — S92.341D CLOSED DISPLACED FRACTURE OF FOURTH METATARSAL BONE OF RIGHT FOOT WITH ROUTINE HEALING, SUBSEQUENT ENCOUNTER: Primary | ICD-10-CM

## 2025-02-21 DIAGNOSIS — S82.891A CLOSED FRACTURE DISLOCATION OF ANKLE JOINT, RIGHT, INITIAL ENCOUNTER: ICD-10-CM

## 2025-02-21 LAB
ALBUMIN SERPL-MCNC: 3.4 G/DL (ref 3.5–5.2)
ALP SERPL-CCNC: 92 U/L (ref 35–104)
ALT SERPL-CCNC: 66 U/L (ref 0–32)
ANION GAP SERPL CALCULATED.3IONS-SCNC: 12 MMOL/L (ref 7–16)
AST SERPL-CCNC: 73 U/L (ref 0–31)
BASOPHILS # BLD: 0.04 K/UL (ref 0–0.2)
BASOPHILS NFR BLD: 1 % (ref 0–2)
BILIRUB SERPL-MCNC: 0.5 MG/DL (ref 0–1.2)
BUN SERPL-MCNC: 8 MG/DL (ref 6–20)
CALCIUM SERPL-MCNC: 9 MG/DL (ref 8.6–10.2)
CHLORIDE SERPL-SCNC: 103 MMOL/L (ref 98–107)
CK SERPL-CCNC: 294 U/L (ref 20–180)
CO2 SERPL-SCNC: 20 MMOL/L (ref 22–29)
CREAT SERPL-MCNC: 0.7 MG/DL (ref 0.5–1)
EOSINOPHIL # BLD: 0.39 K/UL (ref 0.05–0.5)
EOSINOPHILS RELATIVE PERCENT: 6 % (ref 0–6)
ERYTHROCYTE [DISTWIDTH] IN BLOOD BY AUTOMATED COUNT: 13.1 % (ref 11.5–15)
ERYTHROCYTE [SEDIMENTATION RATE] IN BLOOD BY WESTERGREN METHOD: 8 MM/HR (ref 0–20)
GFR, ESTIMATED: >90 ML/MIN/1.73M2
GLUCOSE SERPL-MCNC: 97 MG/DL (ref 74–99)
HCT VFR BLD AUTO: 32.9 % (ref 34–48)
HGB BLD-MCNC: 11.1 G/DL (ref 11.5–15.5)
IMM GRANULOCYTES # BLD AUTO: 0.03 K/UL (ref 0–0.58)
IMM GRANULOCYTES NFR BLD: 1 % (ref 0–5)
LYMPHOCYTES NFR BLD: 2.56 K/UL (ref 1.5–4)
LYMPHOCYTES RELATIVE PERCENT: 39 % (ref 20–42)
MAGNESIUM SERPL-MCNC: 2 MG/DL (ref 1.6–2.6)
MCH RBC QN AUTO: 28.4 PG (ref 26–35)
MCHC RBC AUTO-ENTMCNC: 33.7 G/DL (ref 32–34.5)
MCV RBC AUTO: 84.1 FL (ref 80–99.9)
MONOCYTES NFR BLD: 0.49 K/UL (ref 0.1–0.95)
MONOCYTES NFR BLD: 8 % (ref 2–12)
NEUTROPHILS NFR BLD: 46 % (ref 43–80)
NEUTS SEG NFR BLD: 2.98 K/UL (ref 1.8–7.3)
PLATELET # BLD AUTO: 168 K/UL (ref 130–450)
PMV BLD AUTO: 10.6 FL (ref 7–12)
POTASSIUM SERPL-SCNC: 5 MMOL/L (ref 3.5–5)
PROT SERPL-MCNC: 6.6 G/DL (ref 6.4–8.3)
RBC # BLD AUTO: 3.91 M/UL (ref 3.5–5.5)
SODIUM SERPL-SCNC: 135 MMOL/L (ref 132–146)
WBC OTHER # BLD: 6.5 K/UL (ref 4.5–11.5)

## 2025-02-21 PROCEDURE — 85652 RBC SED RATE AUTOMATED: CPT

## 2025-02-21 PROCEDURE — 85025 COMPLETE CBC W/AUTO DIFF WBC: CPT

## 2025-02-21 PROCEDURE — 2500000003 HC RX 250 WO HCPCS: Performed by: EMERGENCY MEDICINE

## 2025-02-21 PROCEDURE — 73630 X-RAY EXAM OF FOOT: CPT

## 2025-02-21 PROCEDURE — 96376 TX/PRO/DX INJ SAME DRUG ADON: CPT

## 2025-02-21 PROCEDURE — 73590 X-RAY EXAM OF LOWER LEG: CPT

## 2025-02-21 PROCEDURE — 73610 X-RAY EXAM OF ANKLE: CPT

## 2025-02-21 PROCEDURE — 6370000000 HC RX 637 (ALT 250 FOR IP): Performed by: EMERGENCY MEDICINE

## 2025-02-21 PROCEDURE — 96361 HYDRATE IV INFUSION ADD-ON: CPT

## 2025-02-21 RX ORDER — HYDROMORPHONE HYDROCHLORIDE 1 MG/ML
1 INJECTION, SOLUTION INTRAMUSCULAR; INTRAVENOUS; SUBCUTANEOUS
Status: DISCONTINUED | OUTPATIENT
Start: 2025-02-21 | End: 2025-02-21 | Stop reason: HOSPADM

## 2025-02-21 RX ORDER — GABAPENTIN 300 MG/1
300 CAPSULE ORAL ONCE
Status: COMPLETED | OUTPATIENT
Start: 2025-02-21 | End: 2025-02-21

## 2025-02-21 RX ORDER — HYDROCODONE BITARTRATE AND ACETAMINOPHEN 5; 325 MG/1; MG/1
1 TABLET ORAL EVERY 8 HOURS PRN
Qty: 6 TABLET | Refills: 0 | Status: SHIPPED | OUTPATIENT
Start: 2025-02-21 | End: 2025-02-23

## 2025-02-21 RX ORDER — HYDROMORPHONE HYDROCHLORIDE 1 MG/ML
1 INJECTION, SOLUTION INTRAMUSCULAR; INTRAVENOUS; SUBCUTANEOUS ONCE
Status: COMPLETED | OUTPATIENT
Start: 2025-02-21 | End: 2025-02-21

## 2025-02-21 RX ADMIN — HYDROMORPHONE HYDROCHLORIDE 1 MG: 1 INJECTION, SOLUTION INTRAMUSCULAR; INTRAVENOUS; SUBCUTANEOUS at 02:19

## 2025-02-21 RX ADMIN — GABAPENTIN 300 MG: 300 CAPSULE ORAL at 02:18

## 2025-02-21 ASSESSMENT — PAIN DESCRIPTION - LOCATION
LOCATION: ANKLE;FOOT
LOCATION: FOOT

## 2025-02-21 ASSESSMENT — PAIN DESCRIPTION - ORIENTATION
ORIENTATION: RIGHT
ORIENTATION: RIGHT

## 2025-02-21 ASSESSMENT — PAIN - FUNCTIONAL ASSESSMENT
PAIN_FUNCTIONAL_ASSESSMENT: 0-10
PAIN_FUNCTIONAL_ASSESSMENT: NONE - DENIES PAIN
PAIN_FUNCTIONAL_ASSESSMENT: NONE - DENIES PAIN

## 2025-02-21 ASSESSMENT — PAIN DESCRIPTION - DESCRIPTORS: DESCRIPTORS: STABBING;SHARP;SHOOTING

## 2025-02-21 ASSESSMENT — PAIN DESCRIPTION - PAIN TYPE: TYPE: ACUTE PAIN;SURGICAL PAIN

## 2025-02-21 ASSESSMENT — PAIN SCALES - GENERAL
PAINLEVEL_OUTOF10: 8
PAINLEVEL_OUTOF10: 9
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
## 2025-02-21 NOTE — ED PROVIDER NOTES
sigmoid colon.  Differential would include underdistention/peristalsis, traumatic injury, and enteritis/colitis.     CT CERVICAL SPINE WO CONTRAST    Result Date: 2/16/2025  EXAMINATION: CT OF THE CERVICAL SPINE WITHOUT CONTRAST 2/16/2025 12:56 am TECHNIQUE: CT of the cervical spine was performed without the administration of intravenous contrast. Multiplanar reformatted images are provided for review. Automated exposure control, iterative reconstruction, and/or weight based adjustment of the mA/kV was utilized to reduce the radiation dose to as low as reasonably achievable. COMPARISON: None. HISTORY: ORDERING SYSTEM PROVIDED HISTORY: trauma TECHNOLOGIST PROVIDED HISTORY: Reason for exam:->trauma What reading provider will be dictating this exam?->CRC FINDINGS: BONES/ALIGNMENT: There is no acute fracture or traumatic malalignment. DEGENERATIVE CHANGES: No severe osseous spinal canal stenosis. SOFT TISSUES: There is no prevertebral soft tissue swelling.     No acute abnormality of the cervical spine.     CT HEAD WO CONTRAST    Result Date: 2/16/2025  EXAMINATION: CT OF THE HEAD WITHOUT CONTRAST  2/16/2025 12:56 am TECHNIQUE: CT of the head was performed without the administration of intravenous contrast. Automated exposure control, iterative reconstruction, and/or weight based adjustment of the mA/kV was utilized to reduce the radiation dose to as low as reasonably achievable. COMPARISON: None. HISTORY: ORDERING SYSTEM PROVIDED HISTORY: trauma TECHNOLOGIST PROVIDED HISTORY: Has a \"code stroke\" or \"stroke alert\" been called?->No Reason for exam:->trauma What reading provider will be dictating this exam?->CRC FINDINGS: BRAIN/VENTRICLES: There is no acute intracranial hemorrhage, mass effect or midline shift.  No abnormal extra-axial fluid collection.  The gray-white differentiation is maintained without evidence of an acute infarct.  There is no evidence of hydrocephalus. ORBITS: The visualized portion of the orbits  IntraVENous Given 2/21/25 0219)                Medical Decision Making/Differential Diagnosis:    CC/HPI Summary, Social Determinants of health, Records Reviewed, DDx, testing done/not done, ED Course, Reassessment, disposition considerations/shared decision making with patient, consults, disposition:        The following labs are interpreted by me:    Recent Results (from the past 24 hour(s))   CK    Collection Time: 02/20/25 11:29 PM   Result Value Ref Range    Total  (H) 20 - 180 U/L   Comprehensive Metabolic Panel    Collection Time: 02/20/25 11:29 PM   Result Value Ref Range    Sodium 135 132 - 146 mmol/L    Potassium 5.0 3.5 - 5.0 mmol/L    Chloride 103 98 - 107 mmol/L    CO2 20 (L) 22 - 29 mmol/L    Anion Gap 12 7 - 16 mmol/L    Glucose 97 74 - 99 mg/dL    BUN 8 6 - 20 mg/dL    Creatinine 0.7 0.50 - 1.00 mg/dL    Est, Glom Filt Rate >90 >60 mL/min/1.73m2    Calcium 9.0 8.6 - 10.2 mg/dL    Total Protein 6.6 6.4 - 8.3 g/dL    Albumin 3.4 (L) 3.5 - 5.2 g/dL    Total Bilirubin 0.5 0.0 - 1.2 mg/dL    Alkaline Phosphatase 92 35 - 104 U/L    ALT 66 (H) 0 - 32 U/L    AST 73 (H) 0 - 31 U/L   Magnesium    Collection Time: 02/20/25 11:29 PM   Result Value Ref Range    Magnesium 2.0 1.6 - 2.6 mg/dL   CBC with Auto Differential    Collection Time: 02/21/25 12:00 AM   Result Value Ref Range    WBC 6.5 4.5 - 11.5 k/uL    RBC 3.91 3.50 - 5.50 m/uL    Hemoglobin 11.1 (L) 11.5 - 15.5 g/dL    Hematocrit 32.9 (L) 34.0 - 48.0 %    MCV 84.1 80.0 - 99.9 fL    MCH 28.4 26.0 - 35.0 pg    MCHC 33.7 32.0 - 34.5 g/dL    RDW 13.1 11.5 - 15.0 %    Platelets 168 130 - 450 k/uL    MPV 10.6 7.0 - 12.0 fL    Neutrophils % 46 43.0 - 80.0 %    Lymphocytes % 39 20.0 - 42.0 %    Monocytes % 8 2.0 - 12.0 %    Eosinophils % 6 0 - 6 %    Basophils % 1 0.0 - 2.0 %    Immature Granulocytes % 1 0.0 - 5.0 %    Neutrophils Absolute 2.98 1.80 - 7.30 k/uL    Lymphocytes Absolute 2.56 1.50 - 4.00 k/uL    Monocytes Absolute 0.49 0.10 - 0.95 k/uL

## 2025-02-21 NOTE — CONSULTS
urinary incontinence  HEMATOLOGIC/LYMPHATIC:  negative for bleeding and petechiae  MUSCULOSKELETAL:  positive for  pain  NEUROLOGICAL:  negative for headaches, dizziness  BEHAVIOR/PSYCH:  negative for increased agitation and anxiety    PHYSICAL EXAM:    VITALS:  /81   Pulse 84   Temp 98 °F (36.7 °C) (Oral)   Resp 17   SpO2 98%   CONSTITUTIONAL: awake, alert, cooperative, no apparent distress, and appears stated age  EYES: Lids and lashes normal, pupils equal, round and reactive to light, extra ocular muscles intact, sclera clear, conjunctiva normal  ENT: Normocephalic, without obvious abnormality, atraumatic, external ears without lesions, oral pharynx with moist mucus membranes  NECK: Trachea midline, skin normal  LUNGS: No increased work of breathing, good air exchange  CARDIOVASCULAR: 2+ pulses throughout and capillary Refill less than 2 seconds  ABDOMEN: soft, non-distended, non-tender and no rebound tenderness or guarding  NEUROLOGIC: Awake, alert, oriented to name, place and time. Cranial nerves II-XII are grossly intact. Motor is 5 out of 5 bilaterally. Sensory is intact. Babinski down going   MUSCULOSKELETAL:  Right lower Extremity:  Dressing removed from right external fixator  No signs of drainage or pin site infection.  Compartments soft and compressible  Maintains flexion extension of all digits  +2/4 DP & PT pulses, Brisk Cap refill, Toes warm and perfused  Distal sensation grossly intact to Peroneals, Sural, Saphenous, and tibial nrss     Secondary Exam:   bilateralUE: No obvious signs of trauma.  -TTP to fingers, hand, wrist, forearm, elbow, humerus, shoulder or clavicle.-- Patient able to flex/extend fingers, wrist, elbow and shoulder with active and passive ROM without pain, +2/4 Radial pulse, cap refill <3sec, +AIN/PIN/Radial/Ulnar/Median N, distal sensation grossly intact to C4-T1 dermatomes, compartments soft and compressible.    leftLE: No obvious signs of trauma.   -TTP to foot,
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
## 2025-02-21 NOTE — ED NOTES
At this time patient arrived via ambulance from [facility]. Pt is alert and oriented x4 and no distress noted
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
## 2025-02-24 RX ORDER — OXYCODONE AND ACETAMINOPHEN 5; 325 MG/1; MG/1
1 TABLET ORAL EVERY 6 HOURS PRN
Qty: 28 TABLET | Refills: 0 | Status: SHIPPED | OUTPATIENT
Start: 2025-02-24 | End: 2025-03-03

## 2025-02-24 NOTE — TELEPHONE ENCOUNTER
Controlled Substance Monitoring:    Acute and Chronic Pain Monitoring:   RX Monitoring Periodic Controlled Substance Monitoring   2/24/2025  12:09 PM No signs of potential drug abuse or diversion identified.

## 2025-02-24 NOTE — TELEPHONE ENCOUNTER
Received call from parent requesting refill of pain medication. Per patient's mother, patient uses [facility] in [place]. Patient's mother states that the Norco prescribed by the ED has been making the patient vomit.    Date of Procedure: 2/16/2025     Procedure:  1.  Application of multiplanar external fixator right lower extremity  2.  Closed treatment of right subtalar dislocation requiring anesthesia  3.  Closed treatment right fourth metatarsal fracture manipulation  4.  Stress examination of the patient's midfoot under anesthesia for potential Lisfranc injury     Surgeon(s):  Jim Mcmullen MD      Length of time from Surgery: 1 week    Medication pended and routed to providers for decision and signature.    Future Appointments   Date Time Provider Department Center   2/26/2025  1:00 PM Jim Mcmullen MD SE Ortho [facility]   3/18/2025  8:00 AM Joseph Alvarenga DO [place] [facility]CC DEP       Electronically signed by Tavia Falcon ATC on 2/24/2025 at 11:59 AM

## 2025-02-26 ENCOUNTER — HOSPITAL ENCOUNTER (OUTPATIENT)
Dept: GENERAL RADIOLOGY | Age: 35
Discharge: HOME | End: 2025-02-28
Payer: COMMERCIAL

## 2025-02-26 ENCOUNTER — OFFICE VISIT (OUTPATIENT)
Dept: ORTHOPEDIC SURGERY | Age: 35
End: 2025-02-26
Payer: COMMERCIAL

## 2025-02-26 VITALS
SYSTOLIC BLOOD PRESSURE: 115 MMHG | HEART RATE: 90 BPM | RESPIRATION RATE: 16 BRPM | TEMPERATURE: 98.1 F | OXYGEN SATURATION: 100 % | DIASTOLIC BLOOD PRESSURE: 79 MMHG

## 2025-02-26 DIAGNOSIS — S92.341D CLOSED DISPLACED FRACTURE OF FOURTH METATARSAL BONE OF RIGHT FOOT WITH ROUTINE HEALING, SUBSEQUENT ENCOUNTER: ICD-10-CM

## 2025-02-26 DIAGNOSIS — S93.314D CLOSED TRAUMATIC DISLOCATION OF RIGHT SUBTALAR JOINT, SUBSEQUENT ENCOUNTER: Primary | ICD-10-CM

## 2025-02-26 DIAGNOSIS — S82.891A CLOSED FRACTURE DISLOCATION OF ANKLE JOINT, RIGHT, INITIAL ENCOUNTER: ICD-10-CM

## 2025-02-26 DIAGNOSIS — S93.314D CLOSED TRAUMATIC DISLOCATION OF RIGHT SUBTALAR JOINT, SUBSEQUENT ENCOUNTER: ICD-10-CM
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
## 2025-02-26 DIAGNOSIS — M54.2 NECK PAIN: ICD-10-CM

## 2025-02-26 PROCEDURE — 99024 POSTOP FOLLOW-UP VISIT: CPT | Performed by: ORTHOPAEDIC SURGERY

## 2025-02-26 PROCEDURE — 99213 OFFICE O/P EST LOW 20 MIN: CPT | Performed by: ORTHOPAEDIC SURGERY

## 2025-02-26 PROCEDURE — 73590 X-RAY EXAM OF LOWER LEG: CPT

## 2025-02-26 PROCEDURE — 73630 X-RAY EXAM OF FOOT: CPT

## 2025-02-26 PROCEDURE — 73610 X-RAY EXAM OF ANKLE: CPT

## 2025-02-26 RX ORDER — BACITRACIN ZINC AND POLYMYXIN B SULFATE 500; 1000 [USP'U]/G; [USP'U]/G
OINTMENT TOPICAL
Qty: 28.4 G | Refills: 1 | OUTPATIENT
Start: 2025-02-26 | End: 2025-03-05

## 2025-02-26 RX ORDER — OXYCODONE AND ACETAMINOPHEN 5; 325 MG/1; MG/1
1 TABLET ORAL EVERY 6 HOURS PRN
Qty: 28 TABLET | Refills: 0 | OUTPATIENT
Start: 2025-02-26 | End: 2025-03-05

## 2025-02-26 RX ORDER — BACITRACIN ZINC AND POLYMYXIN B SULFATE 500; 1000 [USP'U]/G; [USP'U]/G
OINTMENT TOPICAL
Qty: 28.4 G | Refills: 1 | Status: SHIPPED | OUTPATIENT
Start: 2025-02-26 | End: 2025-03-05

## 2025-02-26 RX ORDER — OXYCODONE AND ACETAMINOPHEN 5; 325 MG/1; MG/1
1 TABLET ORAL EVERY 6 HOURS PRN
Qty: 28 TABLET | Refills: 0 | Status: SHIPPED | OUTPATIENT
Start: 2025-02-26 | End: 2025-03-05

## 2025-02-26 RX ORDER — METHOCARBAMOL 1000 MG/1
1000 TABLET, FILM COATED ORAL 4 TIMES DAILY
Qty: 40 TABLET | Refills: 0 | OUTPATIENT
Start: 2025-02-26 | End: 2025-03-08

## 2025-02-26 RX ORDER — METHOCARBAMOL 1000 MG/1
1000 TABLET, FILM COATED ORAL 4 TIMES DAILY
Qty: 40 TABLET | Refills: 0 | Status: SHIPPED | OUTPATIENT
Start: 2025-02-26 | End: 2025-03-08

## 2025-02-26 NOTE — PROGRESS NOTES
Chief Complaint   Patient presents with    Post-Op Check     10 day p/o RIght LE Ext Fix Closed treatment of right subtalar dislocation requiring anesthesia, Closed treatment right fourth metatarsal fracture manipulation, Stress examination of the patient's midfoot under anesthesia for potential Lisfranc injury 2/16/25.  Ambulating in wheelchair. States pain is 8/10         SUBJECTIVE: Patient is a very pleasant 34-year-old female status post right closed subtalar dislocation status post treatment with external fixator.  On her CT scan she was found to have a comminuted lateral process of the talus and some subchondral fracture on the posterior medial side although small.  She also has pre-existing talonavicular significant arthritis in the base of the fourth metatarsal fracture.  These are currently being treated in an external fixator.  I talked her today in detail she is accompanied by her brother about the fact that I do not think I can improve the alignment much of her joint and the fact she has pre-existing arthritis would be better off to let this consolidate and see how she does with walking if she has problems on the road would most likely be subtalar arthritis and require injection or even a fusion.  She understands this and agrees with the treatment plan.  We will start pin care and external fixator and see her back in 3 weeks time and set her up for probably removal around week 6 or 7 postoperatively.  She is agreeable with the plan.          Past Medical History:   Diagnosis Date    Anxiety     Asthma     Drug abuse (HCC)        Past Surgical History:   Procedure Laterality Date    ANKLE FRACTURE SURGERY Right 2/16/2025    APPLICATION OF RIGHT ANKLE EXTERNAL FIXATOR performed by Jim Mcmullen MD [place] [facility] [place]       Family History   Problem Relation Age of Onset    High Blood Pressure Mother     Cancer Mother     High Blood Pressure Father        Social History     Tobacco Use    Smoking status:

## 2025-02-26 NOTE — PATIENT INSTRUCTIONS
Continue strict nonweightbearing right lower extremity    Continue taking aspirin to prevent blood clots    Percocet refilled currently not taking Suboxone therefore this is for postoperative pain    Voltaren added for an anti-inflammatory and muscle relaxer refilled    Follow-up here in 3 weeks for planning of external fixator removal and fluoroscopic evaluation of the stability of the subtalar joint    Call sooner with any questions, concerns, or worsening of symptoms

## 2025-03-10 DIAGNOSIS — S93.314D CLOSED TRAUMATIC DISLOCATION OF RIGHT SUBTALAR JOINT, SUBSEQUENT ENCOUNTER: ICD-10-CM

## 2025-03-10 RX ORDER — OXYCODONE AND ACETAMINOPHEN 5; 325 MG/1; MG/1
1 TABLET ORAL EVERY 8 HOURS PRN
Qty: 21 TABLET | Refills: 0 | Status: SHIPPED | OUTPATIENT
Start: 2025-03-10 | End: 2025-03-17

## 2025-03-10 NOTE — TELEPHONE ENCOUNTER
Controlled Substance Monitoring:    Acute and Chronic Pain Monitoring:   RX Monitoring Periodic Controlled Substance Monitoring   3/10/2025  11:58 AM No signs of potential drug abuse or diversion identified.

## 2025-03-10 NOTE — TELEPHONE ENCOUNTER
Received call from patient requesting refill of Percocet 5/325 mg.    Date of Procedure: 2/16/2025     Procedure:  1.  Application of multiplanar external fixator right lower extremity     2.  Closed treatment of right subtalar dislocation requiring anesthesia     3.  Closed treatment right fourth metatarsal fracture manipulation     4.  Stress examination of the patient's midfoot under anesthesia for potential Lisfranc injury     Surgeon(s):  Jim Mcmullen MD    Weeks from Surgery: 3    Last OV: 2/26/25    Medication pended and routed to providers for decision and signature.    Future Appointments   Date Time Provider Department Center   3/18/2025  8:00 AM Joseph Alvarenga DO [place] [facility] ECC DEP   3/19/2025  9:45 AM SCHEDULE, SE ORTHO [facility] SE Ortho [facility]       Electronically signed by Teri Burgos RN on 3/10/2025 at 9:40 AM

## 2025-03-11 DIAGNOSIS — S93.314D CLOSED TRAUMATIC DISLOCATION OF RIGHT SUBTALAR JOINT, SUBSEQUENT ENCOUNTER: Primary | ICD-10-CM

## 2025-03-11 RX ORDER — OXYCODONE AND ACETAMINOPHEN 5; 325 MG/1; MG/1
1 TABLET ORAL EVERY 8 HOURS PRN
Qty: 21 TABLET | Refills: 0 | OUTPATIENT
Start: 2025-03-11 | End: 2025-03-18

## 2025-03-12 ENCOUNTER — HOSPITAL ENCOUNTER (EMERGENCY)
Age: 35
Discharge: HOME | End: 2025-03-13
Payer: OTHER MISCELLANEOUS

## 2025-03-12 ENCOUNTER — APPOINTMENT (OUTPATIENT)
Dept: GENERAL RADIOLOGY | Age: 35
End: 2025-03-12
Payer: OTHER MISCELLANEOUS

## 2025-03-12 VITALS
DIASTOLIC BLOOD PRESSURE: 82 MMHG | WEIGHT: 180 LBS | TEMPERATURE: 98.1 F | OXYGEN SATURATION: 99 % | SYSTOLIC BLOOD PRESSURE: 128 MMHG | HEART RATE: 92 BPM | RESPIRATION RATE: 18 BRPM | BODY MASS INDEX: 29.05 KG/M2

## 2025-03-12 DIAGNOSIS — G89.18 POST-OPERATIVE PAIN: ICD-10-CM

## 2025-03-12 DIAGNOSIS — M79.604 PAIN OF RIGHT LOWER EXTREMITY: Primary | ICD-10-CM

## 2025-03-12 PROCEDURE — 96372 THER/PROPH/DIAG INJ SC/IM: CPT

## 2025-03-12 PROCEDURE — 73610 X-RAY EXAM OF ANKLE: CPT

## 2025-03-12 PROCEDURE — 6360000002 HC RX W HCPCS: Performed by: NURSE PRACTITIONER

## 2025-03-12 PROCEDURE — 6370000000 HC RX 637 (ALT 250 FOR IP): Performed by: NURSE PRACTITIONER

## 2025-03-12 PROCEDURE — 99284 EMERGENCY DEPT VISIT MOD MDM: CPT

## 2025-03-12 RX ORDER — OXYCODONE AND ACETAMINOPHEN 5; 325 MG/1; MG/1
1 TABLET ORAL ONCE
Refills: 0 | Status: COMPLETED | OUTPATIENT
Start: 2025-03-12 | End: 2025-03-12

## 2025-03-12 RX ORDER — KETOROLAC TROMETHAMINE 30 MG/ML
30 INJECTION, SOLUTION INTRAMUSCULAR; INTRAVENOUS ONCE
Status: COMPLETED | OUTPATIENT
Start: 2025-03-12 | End: 2025-03-12

## 2025-03-12 RX ADMIN — OXYCODONE AND ACETAMINOPHEN 1 TABLET: 325; 5 TABLET ORAL at 21:49

## 2025-03-12 RX ADMIN — KETOROLAC TROMETHAMINE 30 MG: 30 INJECTION, SOLUTION INTRAMUSCULAR at 21:49

## 2025-03-12 ASSESSMENT — PAIN DESCRIPTION - ORIENTATION
ORIENTATION: LEFT
ORIENTATION: RIGHT

## 2025-03-12 ASSESSMENT — PAIN - FUNCTIONAL ASSESSMENT: PAIN_FUNCTIONAL_ASSESSMENT: 0-10

## 2025-03-12 ASSESSMENT — PAIN SCALES - GENERAL
PAINLEVEL_OUTOF10: 10
PAINLEVEL_OUTOF10: 10

## 2025-03-12 ASSESSMENT — PAIN DESCRIPTION - DESCRIPTORS
DESCRIPTORS: THROBBING;ACHING;DISCOMFORT
DESCRIPTORS: ACHING

## 2025-03-12 ASSESSMENT — PAIN DESCRIPTION - LOCATION
LOCATION: FOOT
LOCATION: ANKLE;FOOT

## 2025-03-13 ENCOUNTER — TELEPHONE (OUTPATIENT)
Dept: ORTHOPEDIC SURGERY | Age: 35
End: 2025-03-13
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
## 2025-03-13 NOTE — TELEPHONE ENCOUNTER
Patient called the after-hours phone line yesterday evening, 3/12/2025.  This PA called the patient back after receiving her voicemail.  Patient is taking oxycodone 5 mg not as prescribed, stating that she is taking them every 2 hours and that they do not really help that much.  Also taking Robaxin and gabapentin.  States that she is in intractable pain.  Strongly recommended multiple times over an 8-minute phone call to go to the emergency department.  Patient verbalized understanding and agreed to the plan.

## 2025-03-13 NOTE — DISCHARGE INSTRUCTIONS
Continue pain medication at home.  Use oxycodone and diclofenac.  Elevate the extremity.  Keep ortho follow up appointments

## 2025-03-14 RX ORDER — METHOCARBAMOL 1000 MG/1
1000 TABLET, FILM COATED ORAL 4 TIMES DAILY
Qty: 40 TABLET | Refills: 0 | Status: SHIPPED | OUTPATIENT
Start: 2025-03-14 | End: 2025-03-24

## 2025-03-14 RX ORDER — OXYCODONE AND ACETAMINOPHEN 5; 325 MG/1; MG/1
1 TABLET ORAL EVERY 8 HOURS PRN
Qty: 21 TABLET | Refills: 0 | Status: SHIPPED | OUTPATIENT
Start: 2025-03-17 | End: 2025-03-24

## 2025-03-14 RX ORDER — ACETAMINOPHEN 500 MG
1000 TABLET ORAL EVERY 6 HOURS PRN
Qty: 120 TABLET | Refills: 3 | Status: SHIPPED | OUTPATIENT
Start: 2025-03-14

## 2025-03-14 RX ORDER — GABAPENTIN 300 MG/1
600 CAPSULE ORAL 3 TIMES DAILY
Qty: 60 CAPSULE | Refills: 0 | Status: SHIPPED | OUTPATIENT
Start: 2025-03-14 | End: 2025-03-24

## 2025-03-14 NOTE — TELEPHONE ENCOUNTER
Patient returned call to office yesterday, 3/13/25, and left message stating that she had a missed call from our office and was calling back. Voice message was received this AM when staff arrived back at office.     Patient's mother also called office and left another voicemail, upset that no one was calling them back and that her daughter is in severe pain and out of medication.    Attempted to call patient at this time. Again no answer and no voicemail box is set up. Attempted a second call and was able to speak to patient. Patient states that she did  the pain medication script from 3/10/2025 however this is already gone, as she has been taking 2 at time just to touch the pain. Patient states that she got went to ED as advised by Oz Robertson PA-C however, she states that \"that was a waste of time, as all that was done was imaging and then a shot of Toradol.\" Explained to patient the office policy on narcotics and controlled substances in terms of the limitations and regulations from the pharmacy. Patient became upset on the phone and began crying, stating that she feel very frustrated that she is in pain and feels like nothing is working to help her. Informed patient that I would discuss with the providers some options on medications and then call her back this afternoon. Patient verbalized gratitude for my time and said that she would keep her phone with her and on high volume so as not to miss the call.    Electronically signed by Tavia Falcon ATC on 3/14/2025 at 1:21 PM

## 2025-03-14 NOTE — TELEPHONE ENCOUNTER
Received voice message from patient's mother, very upset, regarding pain control for patient. Patient was in ED yesterday and patient's mother stated in her message that patient is still in severe pain, despite the medication regimen that has been provided. Requested a callback to either herself or the patient.     Attempted to call patient. No answer and no voicemail box was set up to leave a message.  Attempted to call patient's mother as well, also no answer and unable to leave a voicemail.     Electronically signed by Tavia Falcon ATC on 3/13/2025 at 3:19 PM

## 2025-03-14 NOTE — TELEPHONE ENCOUNTER
Patient is not due for any narcotic pain medication until next Monday 3/17/25. Rx provided to be filled on Monday. Q8 hours.    She was provided Diclofenac three times per day on 2/26/25 with 2 refills. Qty. 90 pills. She is not due for a refill of Diclofenac at this time.     Patient was provided gabapentin 300mg TID for 10 days on 2/18/25. This can be renewed and also increased for pain control which I think is reasonable. We will plan for 600mg TID.     Patient was provided Robaxin 1000mg QID on 2/26/25 QID for 10 days. She is due for a refill. This was sent to the pharmacy.    Tylenol was also sent to the pharmacy.     Patient was previously on buprenorphine-naloxone. This has not been rx since 7/5/24

## 2025-03-14 NOTE — TELEPHONE ENCOUNTER
Return call placed to patient at this time. Explained the recommendations and medication orders of the providers. Patient agreeable and verbalized understanding.    Electronically signed by Tavia Falcon ATC on 3/14/2025 at 2:27 PM

## 2025-03-19 ENCOUNTER — TELEPHONE (OUTPATIENT)
Dept: ORTHOPEDIC SURGERY | Age: 35
End: 2025-03-19

## 2025-03-19 NOTE — TELEPHONE ENCOUNTER
Pt's sister l/m to cancel/r/s appt for today. She states pt is throwing up. Attempted to call pt to r/s and was unable to leave a voice mail b/c a voice mail is not set up.

## 2025-03-24 DIAGNOSIS — S93.314D CLOSED TRAUMATIC DISLOCATION OF RIGHT SUBTALAR JOINT, SUBSEQUENT ENCOUNTER: ICD-10-CM

## 2025-03-24 RX ORDER — OXYCODONE AND ACETAMINOPHEN 5; 325 MG/1; MG/1
1 TABLET ORAL 2 TIMES DAILY PRN
Qty: 14 TABLET | Refills: 0 | Status: SHIPPED | OUTPATIENT
Start: 2025-03-24 | End: 2025-03-31
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
## 2025-03-24 NOTE — TELEPHONE ENCOUNTER
Controlled Substance Monitoring:    Acute and Chronic Pain Monitoring:   RX Monitoring Periodic Controlled Substance Monitoring   3/24/2025  11:21 AM No signs of potential drug abuse or diversion identified.

## 2025-03-24 NOTE — TELEPHONE ENCOUNTER
Received call from parent requesting refill of Percocet 5/325 mg at [facility].    Date of Procedure: 2/16/2025     Procedure:  1.  Application of multiplanar external fixator right lower extremity     2.  Closed treatment of right subtalar dislocation requiring anesthesia     3.  Closed treatment right fourth metatarsal fracture manipulation     4.  Stress examination of the patient's midfoot under anesthesia for potential Lisfranc injury     Surgeon(s):  Jim Mcmullen MD    Weeks from Surgery: 5    Last OV: 2/26/25    Medication pended and routed to providers for decision and signature.    No future appointments.    Electronically signed by Teri Burgos RN on 3/24/2025 at 9:52 AM

## 2025-03-26 ENCOUNTER — OFFICE VISIT (OUTPATIENT)
Dept: ORTHOPEDIC SURGERY | Age: 35
End: 2025-03-26
Payer: COMMERCIAL

## 2025-03-26 ENCOUNTER — PREP FOR PROCEDURE (OUTPATIENT)
Dept: ORTHOPEDIC SURGERY | Age: 35
End: 2025-03-26

## 2025-03-26 ENCOUNTER — HOSPITAL ENCOUNTER (OUTPATIENT)
Dept: GENERAL RADIOLOGY | Age: 35
Discharge: HOME | End: 2025-03-28
Payer: COMMERCIAL

## 2025-03-26 ENCOUNTER — TELEPHONE (OUTPATIENT)
Dept: ORTHOPEDIC SURGERY | Age: 35
End: 2025-03-26

## 2025-03-26 ENCOUNTER — HOSPITAL ENCOUNTER (OUTPATIENT)
Dept: ULTRASOUND IMAGING | Age: 35
Discharge: HOME | End: 2025-03-28
Payer: COMMERCIAL

## 2025-03-26 VITALS
RESPIRATION RATE: 16 BRPM | HEART RATE: 111 BPM | OXYGEN SATURATION: 99 % | DIASTOLIC BLOOD PRESSURE: 77 MMHG | SYSTOLIC BLOOD PRESSURE: 116 MMHG

## 2025-03-26 DIAGNOSIS — S93.314D CLOSED TRAUMATIC DISLOCATION OF RIGHT SUBTALAR JOINT, SUBSEQUENT ENCOUNTER: ICD-10-CM

## 2025-03-26 DIAGNOSIS — Z97.10: ICD-10-CM

## 2025-03-26 DIAGNOSIS — S93.314D CLOSED TRAUMATIC DISLOCATION OF RIGHT SUBTALAR JOINT, SUBSEQUENT ENCOUNTER: Primary | ICD-10-CM

## 2025-03-26 DIAGNOSIS — S93.314D: ICD-10-CM

## 2025-03-26 PROBLEM — S82.891A CLOSED FRACTURE OF RIGHT ANKLE: Status: ACTIVE | Noted: 2025-03-26

## 2025-03-26 PROCEDURE — 99024 POSTOP FOLLOW-UP VISIT: CPT | Performed by: PHYSICIAN ASSISTANT

## 2025-03-26 PROCEDURE — 73610 X-RAY EXAM OF ANKLE: CPT

## 2025-03-26 PROCEDURE — 99214 OFFICE O/P EST MOD 30 MIN: CPT | Performed by: PHYSICIAN ASSISTANT

## 2025-03-26 PROCEDURE — 93971 EXTREMITY STUDY: CPT

## 2025-03-26 PROCEDURE — 73630 X-RAY EXAM OF FOOT: CPT

## 2025-03-26 RX ORDER — METHOCARBAMOL 500 MG/1
500 TABLET, FILM COATED ORAL 3 TIMES DAILY
COMMUNITY
Start: 2025-02-26 | End: 2025-03-27

## 2025-03-26 RX ORDER — POLYETHYLENE GLYCOL 3350 17 G/17G
17 POWDER ORAL DAILY
COMMUNITY
Start: 2025-02-18

## 2025-03-26 RX ORDER — IBUPROFEN 800 MG/1
800 TABLET, FILM COATED ORAL 3 TIMES DAILY
COMMUNITY
Start: 2025-02-25

## 2025-03-26 NOTE — PATIENT INSTRUCTIONS
Procedure: RLE Ex-Fix removal with Fluoroscopic exam of Subtalar Joint w/ possible internal fixation.     You will not need medical clearance prior to surgery.     Your surgery is scheduled for 03/31/25 at 4:30 PM  with Dr. Jim Mcmullen MD at the main [place] [facility] in [place] .  You will need to report to Preop area  that morning at 12:00 PM .   All surgery start times are subject to change. You will be notified by office and/or PAT department if your surgery time changes. If you need to cancel/reschedule your surgery for any reason, please contact [facility] at [phone], ext #[phone] ASAP.   You are having Outpatient surgery, but plan for 1-2 nights in the hospital for recovery if needed.  Preadmission Testing (PAT) department at [facility] will contact you with further details regarding pre-operative blood work, where to park and enter the hospital, your medication list, etc. If you have any surgery related questions please contact PAT at [facility] at [phone].  Nothing to eat or drink after midnight the night before surgery.  You may take a pain pill and any other medicine PAT instructs you to take with small sip of water if needed.  Continue with ice and elevation to reduce swelling  Non weight bearing right lower extremity, use assistive devices if needed (wheelchair, walker, crutches, cane, etc).  If you are taking Aspirin or Lovenox, hold the day of surgery. If taking Eliquis, hold this 48 hours prior to surgery. Hold all NSAIDs (non-steroidal anti-inflammatories like Advil, motrin, ibuprofen, etc) 7 days prior to surgery.    Call office with any question or concerns: [phone]    All surgical patients will be gradually tapered off narcotic pain medication post operatively, this office will not continue narcotics longer than 6 weeks from date of surgery. If narcotics are required longer than this time period you will be referred to pain management.    Your first
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
## 2025-03-26 NOTE — TELEPHONE ENCOUNTER
Patient Name:  Aileen Raman  Patient :  1990        DIAGNOSIS & PROCEDURE:                       Procedure/Operation: Removal of External Fixator from RLE, Fluoroscopic Examination of Right Subtalar Joint with Possible ORIF of Right Subtalar Joint Fracture            Diagnosis:  Closed Traumatic Dislocation of Right Subtalar Joint, Closed Fracture Dislocation of Right Ankle Joint, S/P External Fixator Application to RLE    Location:  [facility]    Surgeon:  Dr. Jim Mcmullen MD    SCHEDULING INFORMATION:                          Date: 3-    Time: TBD             Anesthesia:  General                                              Status: Outpatient    Special Comments:         Vendor: [employer]  []   Notified     Electronically signed by Regina Bernstein MA on 3/26/2025 at 3:22 PM
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
## 2025-03-26 NOTE — H&P (VIEW-ONLY)
normochephalic atraumatic, external ears and eyes normal, sclera normal, neck supple  Extremities:   peripheral pulses normal, no edema, redness or tenderness in the calves   Skin: normal coloration, no rashes or open wounds, no suspicious skin lesions noted  Psych: Affect euthymic   Musculoskeletal:    Extremity:  Right Lower Extremity  Skin clean dry and intact, without signs of infection  External fixator is intact.  Pin sites are without erythema or drainage.  Moderate edema noted to the ankle/foot  Compartments supple throughout thigh and leg  Calf supple and nontender  Demonstrates active knee flexion/extension  Able to flex toes minimally due to swelling  States sensation intact to touch in sural/deep peroneal/superficial peroneal/saphenous/posterior tibial nerve distributions to foot/ankle.   Palpable dorsalis pedis and posterior tibialis pulses, cap refill brisk in toes, foot warm/perfused.     /77 (BP Site: Left Upper Arm, Patient Position: Sitting, BP Cuff Size: Large Adult)   Pulse (!) 111   Resp 16   SpO2 99%      XR: 3/26/25   3 views right ankle and right foot demonstrate located subtalar joint with pre-existing arthritis and healing base of the fourth metatarsal fracture still remaining minimally displaced.  No new osseous abnormalities compared to x-rays from 2/26/2025.    ASSESSMENT:   Diagnosis Orders   1. Closed traumatic dislocation of right subtalar joint, subsequent encounter          Discussion:  Had lengthy discussion with patient regarding Her diagnosis, typical prognosis, and expected outcomes. I reviewed the possible complications from the injury itself despite treatment choosen. I also discussed treatment options including nonoperative managements versus surgical management, along with risks and benefits of each. They have elected for operative management at this time.      Discussed with patient factors that can impact patient's fracture healing. Patient has the following risk  factors for union: Nicotine Use    Risk, benefits and treatment options discussed with Aileen Raman.  she has verbalized understanding of options. The possibility of complications were also discussed to include but not limited to nerve damage, infection, problems with wound healing, vascular injury, chronic pain, stiffness, dysfunction, nonhealing of the bone, symptomatic hardware and/or its failure, need for subsequent surgery, dislocation, and blood clots as well as medical related problems and other problems not specifically discussed. Risk of anesthesia also discussed to include death.   Post-op care, work, activity and restrictions which included the use of pain medication and possibility of using blood thinner post op were also discussed with Aileen Raman and she verbalized and agreed with the restrictions.     PLAN:  Plan for OR on 3-31-25 with Dr. Jim Mcmullen MD for removal of external fixator RLE, fluoroscopic examination of subtalar joint with possible internal fixation  Pre-surgery medical clearance is not needed  NPO after midnight the night before surgery  NWB on right lower extremity  Continue external fixator dressing until surgery  Hold NSAIDS 7 days prior to surgery  Hold aspirin the morning of your surgery    Controlled Substance Monitoring:    Acute and Chronic Pain Monitoring:   RX Monitoring Periodic Controlled Substance Monitoring   3/24/2025  11:21 AM No signs of potential drug abuse or diversion identified.     Electronically signed by JULIUS Castillo on 3/26/2025 at 2:23 PM  Note: This report was completed using computerSmartHub voiced recognition software.  Every effort has been made to ensure accuracy; however, inadvertent computerized transcription errors may be present.

## 2025-03-27 RX ORDER — GABAPENTIN 300 MG/1
CAPSULE ORAL
Qty: 45 CAPSULE | Refills: 0 | Status: SHIPPED | OUTPATIENT
Start: 2025-03-27 | End: 2025-04-11

## 2025-03-27 RX ORDER — METHOCARBAMOL 500 MG/1
TABLET, FILM COATED ORAL
Qty: 80 TABLET | Refills: 0 | Status: SHIPPED | OUTPATIENT
Start: 2025-03-27

## 2025-03-27 RX ORDER — OXYCODONE AND ACETAMINOPHEN 5; 325 MG/1; MG/1
1 TABLET ORAL EVERY 8 HOURS PRN
Qty: 21 TABLET | Refills: 0 | Status: SHIPPED | OUTPATIENT
Start: 2025-03-27 | End: 2025-04-03

## 2025-03-27 RX ORDER — SODIUM CHLORIDE 9 MG/ML
INJECTION, SOLUTION INTRAVENOUS PRN
Status: CANCELLED | OUTPATIENT
Start: 2025-03-27

## 2025-03-27 RX ORDER — SODIUM CHLORIDE 0.9 % (FLUSH) 0.9 %
5-40 SYRINGE (ML) INJECTION PRN
Status: CANCELLED | OUTPATIENT
Start: 2025-03-27

## 2025-03-27 RX ORDER — SODIUM CHLORIDE 0.9 % (FLUSH) 0.9 %
5-40 SYRINGE (ML) INJECTION EVERY 12 HOURS SCHEDULED
Status: CANCELLED | OUTPATIENT
Start: 2025-03-27

## 2025-03-27 NOTE — TELEPHONE ENCOUNTER
Pharmacy interface requesting refill of Robaxin 500 mg and Gabapentn 300 mg .    Last office visit:3/26/25    Future Appointments   Date Time Provider Department Center   4/14/2025  1:00 PM SCHEDULE, SE ORTHO [facility]

## 2025-03-27 NOTE — PROGRESS NOTES
[facility]   PRE-ADMISSION TESTING GENERAL INSTRUCTIONS  PAT Phone Number: [phone]      GENERAL INSTRUCTIONS:    [x] Antibacterial Soap Shower Night before AND the Morning of procedure.    [x] Do not wear makeup, lotions, powders, deodorant the morning of surgery.  [x] No solid food after midnight. You may have SIPS of clear liquids up until 2 hours before your arrival time to the hospital.   [x] You may brush your teeth, gargle, but do not swallow water.   [x] No tobacco products, illegal drugs,marijuana or alcohol within 24 hours of your surgery.  [x] Jewelry or valuables should not be brought to the hospital. All body and/or tongue piercing's must be removed prior to arriving to hospital. No contact lens or hair pins.   [x] Arrange transportation with a responsible adult  to and from the hospital. Arrange for someone to be with you for the remainder of the day and for 24 hours after your procedure due to having had anesthesia.          -Who will be your  for transportation? mom        -Who will be staying with you for 24 hrs after your procedure? mom  [x] Bring insurance card and photo ID.    [x] Urine Pregnancy test will be preformed the day of surgery. A specimen sample may be brought from home.       PARKING INSTRUCTIONS:     [x] ARRIVAL DATE & TIME: 3/31 12:45  [x] Times are subject to change. We will contact you the business day before surgery if that were to occur.  [x] Enter into the [facility] Entrance. Two people may accompany you. Masks are not required.  [x] Parking Lot \"I\" is where you will park. It is located on the corner of [address] and [address]. The entrance is on [address].   Only one vehicle - per patient, is permitted in parking lot.   Upon entering the parking lot, a voucher ticket will print.    EDUCATION INSTRUCTIONS:           [x] [facility] Pamphlet placed in chart.    [x] Pain: Post-op pain is

## 2025-03-30 ENCOUNTER — ANESTHESIA EVENT (OUTPATIENT)
Dept: OPERATING ROOM | Age: 35
End: 2025-03-30
Payer: COMMERCIAL

## 2025-03-31 ENCOUNTER — ANESTHESIA (OUTPATIENT)
Dept: OPERATING ROOM | Age: 35
End: 2025-03-31
Payer: COMMERCIAL

## 2025-03-31 ENCOUNTER — HOSPITAL ENCOUNTER (OUTPATIENT)
Age: 35
Setting detail: OUTPATIENT SURGERY
Discharge: HOME | End: 2025-03-31
Attending: ORTHOPAEDIC SURGERY | Admitting: ORTHOPAEDIC SURGERY
Payer: COMMERCIAL

## 2025-03-31 VITALS
OXYGEN SATURATION: 97 % | RESPIRATION RATE: 20 BRPM | TEMPERATURE: 97.3 F | WEIGHT: 170 LBS | HEIGHT: 65 IN | BODY MASS INDEX: 28.32 KG/M2 | DIASTOLIC BLOOD PRESSURE: 98 MMHG | HEART RATE: 107 BPM | SYSTOLIC BLOOD PRESSURE: 127 MMHG

## 2025-03-31 DIAGNOSIS — Z01.812 PRE-OPERATIVE LABORATORY EXAMINATION: Primary | ICD-10-CM

## 2025-03-31 LAB
ERYTHROCYTE [DISTWIDTH] IN BLOOD BY AUTOMATED COUNT: 13.2 % (ref 11.5–15)
HCT VFR BLD AUTO: 35.3 % (ref 34–48)
HGB BLD-MCNC: 11.3 G/DL (ref 11.5–15.5)
MCH RBC QN AUTO: 28 PG (ref 26–35)
MCHC RBC AUTO-ENTMCNC: 32 G/DL (ref 32–34.5)
MCV RBC AUTO: 87.6 FL (ref 80–99.9)
PLATELET # BLD AUTO: 217 K/UL (ref 130–450)
PMV BLD AUTO: 11.1 FL (ref 7–12)
RBC # BLD AUTO: 4.03 M/UL (ref 3.5–5.5)
WBC OTHER # BLD: 5.1 K/UL (ref 4.5–11.5)

## 2025-03-31 PROCEDURE — 85027 COMPLETE CBC AUTOMATED: CPT

## 2025-03-31 RX ORDER — LABETALOL HYDROCHLORIDE 5 MG/ML
5 INJECTION, SOLUTION INTRAVENOUS
Status: CANCELLED | OUTPATIENT
Start: 2025-03-31

## 2025-03-31 RX ORDER — MIDAZOLAM HYDROCHLORIDE 2 MG/2ML
2 INJECTION, SOLUTION INTRAMUSCULAR; INTRAVENOUS
Status: CANCELLED | OUTPATIENT
Start: 2025-03-31

## 2025-03-31 RX ORDER — MEPERIDINE HYDROCHLORIDE 25 MG/ML
12.5 INJECTION INTRAMUSCULAR; INTRAVENOUS; SUBCUTANEOUS EVERY 5 MIN PRN
Refills: 0 | Status: CANCELLED | OUTPATIENT
Start: 2025-03-31

## 2025-03-31 RX ORDER — MIDAZOLAM HYDROCHLORIDE 2 MG/2ML
2 INJECTION, SOLUTION INTRAMUSCULAR; INTRAVENOUS EVERY 10 MIN PRN
Status: DISCONTINUED | OUTPATIENT
Start: 2025-03-31 | End: 2025-04-08 | Stop reason: HOSPADM

## 2025-03-31 RX ORDER — SODIUM CHLORIDE 0.9 % (FLUSH) 0.9 %
5-40 SYRINGE (ML) INJECTION EVERY 12 HOURS SCHEDULED
Status: DISCONTINUED | OUTPATIENT
Start: 2025-03-31 | End: 2025-04-08 | Stop reason: HOSPADM

## 2025-03-31 RX ORDER — SODIUM CHLORIDE 9 MG/ML
INJECTION, SOLUTION INTRAVENOUS PRN
Status: DISCONTINUED | OUTPATIENT
Start: 2025-03-31 | End: 2025-03-31 | Stop reason: SDUPTHER

## 2025-03-31 RX ORDER — SODIUM CHLORIDE 9 MG/ML
INJECTION, SOLUTION INTRAVENOUS PRN
Status: CANCELLED | OUTPATIENT
Start: 2025-03-31

## 2025-03-31 RX ORDER — HYDROMORPHONE HYDROCHLORIDE 1 MG/ML
0.25 INJECTION, SOLUTION INTRAMUSCULAR; INTRAVENOUS; SUBCUTANEOUS EVERY 5 MIN PRN
Refills: 0 | Status: CANCELLED | OUTPATIENT
Start: 2025-03-31

## 2025-03-31 RX ORDER — DROPERIDOL 2.5 MG/ML
0.62 INJECTION, SOLUTION INTRAMUSCULAR; INTRAVENOUS
Status: CANCELLED | OUTPATIENT
Start: 2025-03-31

## 2025-03-31 RX ORDER — IPRATROPIUM BROMIDE AND ALBUTEROL SULFATE 2.5; .5 MG/3ML; MG/3ML
1 SOLUTION RESPIRATORY (INHALATION)
Status: CANCELLED | OUTPATIENT
Start: 2025-03-31

## 2025-03-31 RX ORDER — SODIUM CHLORIDE 0.9 % (FLUSH) 0.9 %
5-40 SYRINGE (ML) INJECTION EVERY 12 HOURS SCHEDULED
Status: CANCELLED | OUTPATIENT
Start: 2025-03-31

## 2025-03-31 RX ORDER — SODIUM CHLORIDE 9 MG/ML
INJECTION, SOLUTION INTRAVENOUS PRN
Status: DISCONTINUED | OUTPATIENT
Start: 2025-03-31 | End: 2025-04-08 | Stop reason: HOSPADM

## 2025-03-31 RX ORDER — ONDANSETRON 2 MG/ML
4 INJECTION INTRAMUSCULAR; INTRAVENOUS
Status: CANCELLED | OUTPATIENT
Start: 2025-03-31

## 2025-03-31 RX ORDER — SODIUM CHLORIDE 0.9 % (FLUSH) 0.9 %
5-40 SYRINGE (ML) INJECTION PRN
Status: DISCONTINUED | OUTPATIENT
Start: 2025-03-31 | End: 2025-04-08 | Stop reason: HOSPADM

## 2025-03-31 RX ORDER — FENTANYL CITRATE 50 UG/ML
25 INJECTION, SOLUTION INTRAMUSCULAR; INTRAVENOUS ONCE
Status: DISCONTINUED | OUTPATIENT
Start: 2025-03-31 | End: 2025-04-08 | Stop reason: HOSPADM

## 2025-03-31 RX ORDER — ALBUTEROL SULFATE 90 UG/1
2 INHALANT RESPIRATORY (INHALATION) EVERY 6 HOURS PRN
Status: DISCONTINUED | OUTPATIENT
Start: 2025-03-31 | End: 2025-04-08 | Stop reason: HOSPADM

## 2025-03-31 RX ORDER — DIPHENHYDRAMINE HYDROCHLORIDE 50 MG/ML
12.5 INJECTION, SOLUTION INTRAMUSCULAR; INTRAVENOUS
Status: CANCELLED | OUTPATIENT
Start: 2025-03-31

## 2025-03-31 RX ORDER — NALOXONE HYDROCHLORIDE 0.4 MG/ML
INJECTION, SOLUTION INTRAMUSCULAR; INTRAVENOUS; SUBCUTANEOUS PRN
Status: CANCELLED | OUTPATIENT
Start: 2025-03-31

## 2025-03-31 RX ORDER — ACETAMINOPHEN 325 MG/1
650 TABLET ORAL
Status: CANCELLED | OUTPATIENT
Start: 2025-03-31

## 2025-03-31 RX ORDER — ROPIVACAINE HYDROCHLORIDE 5 MG/ML
40 INJECTION, SOLUTION EPIDURAL; INFILTRATION; PERINEURAL ONCE
Status: DISCONTINUED | OUTPATIENT
Start: 2025-03-31 | End: 2025-04-08 | Stop reason: HOSPADM

## 2025-03-31 RX ORDER — DEXAMETHASONE SODIUM PHOSPHATE 10 MG/ML
4 INJECTION, SOLUTION INTRAMUSCULAR; INTRAVENOUS ONCE
Status: DISCONTINUED | OUTPATIENT
Start: 2025-03-31 | End: 2025-04-08 | Stop reason: HOSPADM

## 2025-03-31 RX ORDER — HYDRALAZINE HYDROCHLORIDE 20 MG/ML
5 INJECTION INTRAMUSCULAR; INTRAVENOUS
Status: CANCELLED | OUTPATIENT
Start: 2025-03-31

## 2025-03-31 RX ORDER — FENTANYL CITRATE 50 UG/ML
100 INJECTION, SOLUTION INTRAMUSCULAR; INTRAVENOUS ONCE
Status: DISCONTINUED | OUTPATIENT
Start: 2025-03-31 | End: 2025-04-08 | Stop reason: HOSPADM

## 2025-03-31 RX ORDER — HYDROMORPHONE HYDROCHLORIDE 1 MG/ML
0.5 INJECTION, SOLUTION INTRAMUSCULAR; INTRAVENOUS; SUBCUTANEOUS EVERY 5 MIN PRN
Refills: 0 | Status: CANCELLED | OUTPATIENT
Start: 2025-03-31

## 2025-03-31 RX ORDER — SODIUM CHLORIDE 0.9 % (FLUSH) 0.9 %
5-40 SYRINGE (ML) INJECTION PRN
Status: CANCELLED | OUTPATIENT
Start: 2025-03-31

## 2025-03-31 ASSESSMENT — PAIN DESCRIPTION - DESCRIPTORS: DESCRIPTORS: ACHING

## 2025-03-31 ASSESSMENT — PAIN - FUNCTIONAL ASSESSMENT: PAIN_FUNCTIONAL_ASSESSMENT: 0-10

## 2025-03-31 NOTE — DISCHARGE INSTRUCTIONS
[facility] Department of Orthopedic Surgery  [address] [facility] [address]    Dr. Fabien Saeed, DO         MD Dr. Fabien Silva MD Frank Ansevin, PA-C Sara Zatchok, PA-C Tyler Tsangaris PA-C      Orthopaedics Discharge Instructions   Weight bearing Status - Non-weight bearing - on right lower Extremity  Pain Medication   Contact Office for Medication Refill- [phone]  Office can refill pain medications no sooner than every 7 days  If patient discharging to facility then pain control will be continued per facility physician  Ice to operative/injured site for 15-30 minutes of each hour for next 5 days    Recommend that you continue to ice the area 2-3 times per day after this   Elevate operative/injured limb on 2 pillows at home  Goal is to have limb above the heart if able  Continue DVT Prophylaxis (blood clot prevention) as prescribed: aspirin 325mg twice daily   Fracture Care -  maintain boot, can remove for hygiene     Follow up in office in approximately 2 weeks. Your first follow up appointment is often with one of our physician assistants.     Call the office at [phone] if having any concerns.     Watch for these significant complications.  Call physician if they or any other problems occur:  Fever over 101°, redness, swelling or warmth at the operative site  Unrelieved nausea    Foul smelling or cloudy drainage at the operative site   Unrelieved pain    Blood soaked dressing. (Some oozing may be normal)     Numb, pale, blue, cold or tingling extremity          It is the Department of Orthopaedic Trauma's standard of practice that providers will de-escalate (wean) all patients from narcotic (opioid) medications during the post-operative period.   We provide multimodal pain control, but opioid medications are tapered in all of our patients.  If patient requires referral to pain management for prolonged taper from opioid pain medication, we will facilitate this

## 2025-03-31 NOTE — PROGRESS NOTES
Spoke to Dr. Martinez in regards to patient with no IV access, IV team consulted. Also need a urine for a drug screen. Patient brought in urine, told her we could not use that. She used the restroom to give a urine sample and accidentally dropped the cup into the toilet. Waiting on a urine sample.

## 2025-03-31 NOTE — INTERVAL H&P NOTE
Update History & Physical    The patient's History and Physical of March 26, 2025 was reviewed with the patient and I examined the patient. There was no change. The surgical site was confirmed by the patient and me.     Plan: The risks, benefits, expected outcome, and alternative to the recommended procedure have been discussed with the patient. Patient understands and wants to proceed with the procedure.     Electronically signed by Jim Mcmullen MD on 3/31/2025 at 1:04 PM

## 2025-03-31 NOTE — PROGRESS NOTES
Patient refused to wait for OR any longer, patient pulled her own IV out. Stated she has been her long enough waiting. Dr corea aware and also the OR desk aware of her leaving.

## 2025-04-01 ENCOUNTER — PREP FOR PROCEDURE (OUTPATIENT)
Dept: ORTHOPEDIC SURGERY | Age: 35
End: 2025-04-01

## 2025-04-01 ENCOUNTER — TELEPHONE (OUTPATIENT)
Dept: ORTHOPEDIC SURGERY | Age: 35
End: 2025-04-01

## 2025-04-01 DIAGNOSIS — Z97.10: ICD-10-CM

## 2025-04-01 NOTE — TELEPHONE ENCOUNTER
Received voicemail from Lora Raman, mother of the patient.   Mother is asking to reschedule surgery with Dr. Mcmullen from yesterday ASAP for Removal of External Fixator from RLE, with Fluoroscopic Examination of  Right Subtalar Joint Fracture.     Mother states patient has severe anxiety and due to not being able to take her anxiety mediation prior to surgery yesterday, the patient left the hospital yesterday before surgery could be performed.     Mother is asking for surgery to be rescheduled. She also wants to know if Dr. Mcmullen will okay the patient to take her anxiety medication the morning of surgery?    Routing message to provider.

## 2025-04-01 NOTE — TELEPHONE ENCOUNTER
Please reschedule for next Thursday, 4/10. Okay to take anxiety medication the morning of surgery.

## 2025-04-01 NOTE — TELEPHONE ENCOUNTER
Patient Name:  Aileen Raman  Patient :  1990        DIAGNOSIS & PROCEDURE:                       Procedure/Operation: Removal of External Fixator from RLE, Fluoroscopic Examination of Right Subtalar Joint, Possible ORIF Right Subtalar Joint Fracture            Diagnosis:  Closed Traumatic Dislocation of Right Subtalar Joint, Closed Fracture Dislocation of Right Ankle Joint, S/P External Fixator Application to RLE    Location:  [facility]    Surgeon:  Dr. Jim Mcmullen MD    SCHEDULING INFORMATION:                          Date: 4-    Time: TBD             Anesthesia:  General                                              Status: Outpatient    Special Comments:         Vendor: [employer]  []   Notified     Electronically signed by Regina Bernstein MA on 2025 at 3:39 PM

## 2025-04-01 NOTE — TELEPHONE ENCOUNTER
I spoke with the mother, Lora. Surgery with Dr. Mcmullen at [facility] will be rescheduled to 4-. [facility] PAT Department will contact the patient to go over her surgery instructions as well as inform the patient of her surgery start time and hospital arrival time.     Okay to take her anxiety medication the am of surgery with a small sip of water.     Appointment scheduled for 4- will be cancelled since it is the 2 week po check appointment from surgery date of 3-. New 2 week po check appointment from DOS 4- will be on the patient discharge paperwork from the hospital when she is discharged on 4-. Mother verbalized understanding.

## 2025-04-02 DIAGNOSIS — S93.314D CLOSED TRAUMATIC DISLOCATION OF RIGHT SUBTALAR JOINT, SUBSEQUENT ENCOUNTER: ICD-10-CM

## 2025-04-02 RX ORDER — OXYCODONE AND ACETAMINOPHEN 5; 325 MG/1; MG/1
1 TABLET ORAL EVERY 8 HOURS PRN
Qty: 21 TABLET | Refills: 0 | Status: SHIPPED | OUTPATIENT
Start: 2025-04-05 | End: 2025-04-12

## 2025-04-02 NOTE — TELEPHONE ENCOUNTER
Pt's mother called requesting a refill of Percocet be sent to [facility] on [address]. Pt was supposed to have surgery on 3/31/25 to remove ex-fix, but left the hospital without the surgery. Is now rescheduled for 4/10/25.

## 2025-04-02 NOTE — TELEPHONE ENCOUNTER
Percocet refilled every 8 hours as needed 5-325mg  First refill date is 4/5/2025.  She just refilled her last prescription on 3/29/2025.    Controlled Substance Monitoring:    Acute and Chronic Pain Monitoring:   RX Monitoring Periodic Controlled Substance Monitoring   4/2/2025   2:46 PM No signs of potential drug abuse or diversion identified.

## 2025-04-03 RX ORDER — SODIUM CHLORIDE 9 MG/ML
INJECTION, SOLUTION INTRAVENOUS PRN
Status: CANCELLED | OUTPATIENT
Start: 2025-04-03

## 2025-04-03 RX ORDER — SODIUM CHLORIDE 0.9 % (FLUSH) 0.9 %
5-40 SYRINGE (ML) INJECTION EVERY 12 HOURS SCHEDULED
Status: CANCELLED | OUTPATIENT
Start: 2025-04-03

## 2025-04-03 RX ORDER — SODIUM CHLORIDE 0.9 % (FLUSH) 0.9 %
5-40 SYRINGE (ML) INJECTION PRN
Status: CANCELLED | OUTPATIENT
Start: 2025-04-03

## 2025-04-04 RX ORDER — LORAZEPAM 0.5 MG/1
0.5 TABLET ORAL EVERY 6 HOURS PRN
COMMUNITY

## 2025-04-04 NOTE — PROGRESS NOTES
[facility]   PRE-ADMISSION TESTING GENERAL INSTRUCTIONS  PAT Phone Number: [phone]        GENERAL INSTRUCTIONS:     [x] Antibacterial Soap Shower Night before AND the Morning of procedure.  [x] Do not wear makeup, lotions, powders, deodorant the morning of surgery.  [x] No solid food after midnight. You may have SIPS of clear liquids up until 2 hours before your arrival time to the hospital.   [x] You may brush your teeth, gargle, but do not swallow water.   [x] No tobacco products, illegal drugs,marijuana or alcohol within 24 hours of your surgery.  [x] Jewelry or valuables should not be brought to the hospital. All body and/or tongue piercing's must be removed prior to arriving to hospital. No contact lens or hair pins.   [x] Arrange transportation with a responsible adult  to and from the hospital. Arrange for someone to be with you for the remainder of the day and for 24 hours after your procedure due to having had anesthesia.          -Who will be your  for transportation? Mom        -Who will be staying with you for 24 hrs after your procedure?   Mom  [x] Bring insurance card and photo ID.  [x] Urine Pregnancy test will be preformed the day of surgery. A specimen sample may be brought from home.        PARKING INSTRUCTIONS:      [x] ARRIVAL DATE & TIME: 4/10/25 @ 11:00AM  [x] Times are subject to change. We will contact you the business day before surgery if that were to occur.  [x] Enter into the [facility] Entrance. Two people may accompany you. Masks are not required.  [x] Parking Lot \"I\" is where you will park. It is located on the corner of [address] and [address]. The entrance is on [address].   Only one vehicle - per patient, is permitted in parking lot.   Upon entering the parking lot, a voucher ticket will print.     EDUCATION INSTRUCTIONS:                                 [x] [facility] Pamphlet placed in  chart.  [x] Pain: Post-op pain is normal and to be expected. You will be asked to rate your pain from 0-10.     MEDICATION INSTRUCTIONS:     [x] Bring a complete list of your medications, please write the last time you took the medicine, give this list to the nurse in Pre-Op.    [x] Take ONLY the following medications the morning of surgery: gabapentin (Neurontin)    [x] Stop all herbal supplements and vitamins 5 days before surgery. Stop NSAIDS 7 days before surgery.     [x] Follow physician instructions regarding any blood thinners you may be taking.  Aspirin: Hold day of surgery.      WHAT TO EXPECT:     [x] The day of surgery you will be greeted and checked in by the [facility] . In addition, you will be registered in the [facility] by a Patient Access Representative. Please bring your photo ID and insurance card. A nurse will greet you in accordance to the time you are needed in the pre-op area to prepare you for surgery. Please do not be discouraged if you are not greeted in the order you arrive as there are many variables that are involved in patient preparation. Your patience is greatly appreciated as you wait for your nurse.   [x] Delays may occur with surgery and staff will make a sincere effort to keep you informed of delays. If any delays occur with your procedure, we apologize ahead of time for your inconvenience as we recognize the value of your time.

## 2025-04-09 ENCOUNTER — ANESTHESIA EVENT (OUTPATIENT)
Dept: OPERATING ROOM | Age: 35
End: 2025-04-09
Payer: COMMERCIAL

## 2025-04-10 ENCOUNTER — ANESTHESIA (OUTPATIENT)
Dept: OPERATING ROOM | Age: 35
End: 2025-04-10
Payer: COMMERCIAL

## 2025-04-10 ENCOUNTER — HOSPITAL ENCOUNTER (OUTPATIENT)
Age: 35
Setting detail: OUTPATIENT SURGERY
Discharge: HOME | End: 2025-04-10
Attending: ORTHOPAEDIC SURGERY | Admitting: ORTHOPAEDIC SURGERY
Payer: COMMERCIAL

## 2025-04-10 ENCOUNTER — APPOINTMENT (OUTPATIENT)
Dept: GENERAL RADIOLOGY | Age: 35
End: 2025-04-10
Attending: ORTHOPAEDIC SURGERY
Payer: COMMERCIAL

## 2025-04-10 VITALS
SYSTOLIC BLOOD PRESSURE: 119 MMHG | HEIGHT: 65 IN | BODY MASS INDEX: 28.32 KG/M2 | HEART RATE: 79 BPM | RESPIRATION RATE: 18 BRPM | WEIGHT: 170 LBS | OXYGEN SATURATION: 99 % | DIASTOLIC BLOOD PRESSURE: 78 MMHG | TEMPERATURE: 97.6 F

## 2025-04-10 DIAGNOSIS — Z01.812 PRE-OPERATIVE LABORATORY EXAMINATION: Primary | ICD-10-CM

## 2025-04-10 DIAGNOSIS — G89.18 POST-OP PAIN: ICD-10-CM

## 2025-04-10 LAB
AMPHET UR QL SCN: NEGATIVE
BARBITURATES UR QL SCN: NEGATIVE
BENZODIAZ UR QL: POSITIVE
BUPRENORPHINE UR QL: NEGATIVE
CANNABINOIDS UR QL SCN: POSITIVE
COCAINE UR QL SCN: NEGATIVE
FENTANYL UR QL: NEGATIVE
HCG UR QL: NEGATIVE
HCG, URINE, POC: NEGATIVE
Lab: NORMAL
METHADONE UR QL: NEGATIVE
NEGATIVE QC PASS/FAIL: NORMAL
OPIATES UR QL SCN: NEGATIVE
OXYCODONE UR QL SCN: NEGATIVE
PCP UR QL SCN: NEGATIVE
POSITIVE QC PASS/FAIL: NORMAL
TEST INFORMATION: ABNORMAL

## 2025-04-10 PROCEDURE — 2709999900 HC NON-CHARGEABLE SUPPLY: Performed by: ORTHOPAEDIC SURGERY

## 2025-04-10 PROCEDURE — 6360000002 HC RX W HCPCS: Performed by: ANESTHESIOLOGY

## 2025-04-10 PROCEDURE — 3600000013 HC SURGERY LEVEL 3 ADDTL 15MIN: Performed by: ORTHOPAEDIC SURGERY

## 2025-04-10 PROCEDURE — 73630 X-RAY EXAM OF FOOT: CPT

## 2025-04-10 PROCEDURE — 3700000001 HC ADD 15 MINUTES (ANESTHESIA): Performed by: ORTHOPAEDIC SURGERY

## 2025-04-10 PROCEDURE — 3600000003 HC SURGERY LEVEL 3 BASE: Performed by: ORTHOPAEDIC SURGERY

## 2025-04-10 PROCEDURE — 2580000003 HC RX 258: Performed by: ANESTHESIOLOGIST ASSISTANT

## 2025-04-10 PROCEDURE — 80307 DRUG TEST PRSMV CHEM ANLYZR: CPT

## 2025-04-10 PROCEDURE — 7100000011 HC PHASE II RECOVERY - ADDTL 15 MIN: Performed by: ORTHOPAEDIC SURGERY

## 2025-04-10 PROCEDURE — 6360000002 HC RX W HCPCS: Performed by: PHYSICIAN ASSISTANT

## 2025-04-10 PROCEDURE — 6360000002 HC RX W HCPCS: Performed by: ANESTHESIOLOGIST ASSISTANT

## 2025-04-10 PROCEDURE — 84703 CHORIONIC GONADOTROPIN ASSAY: CPT

## 2025-04-10 PROCEDURE — 7100000010 HC PHASE II RECOVERY - FIRST 15 MIN: Performed by: ORTHOPAEDIC SURGERY

## 2025-04-10 PROCEDURE — 3700000000 HC ANESTHESIA ATTENDED CARE: Performed by: ORTHOPAEDIC SURGERY

## 2025-04-10 PROCEDURE — 7100000000 HC PACU RECOVERY - FIRST 15 MIN: Performed by: ORTHOPAEDIC SURGERY

## 2025-04-10 PROCEDURE — 7100000001 HC PACU RECOVERY - ADDTL 15 MIN: Performed by: ORTHOPAEDIC SURGERY

## 2025-04-10 PROCEDURE — 2500000003 HC RX 250 WO HCPCS: Performed by: PHYSICIAN ASSISTANT

## 2025-04-10 RX ORDER — SODIUM CHLORIDE 0.9 % (FLUSH) 0.9 %
5-40 SYRINGE (ML) INJECTION PRN
Status: DISCONTINUED | OUTPATIENT
Start: 2025-04-10 | End: 2025-04-10 | Stop reason: HOSPADM

## 2025-04-10 RX ORDER — PROPOFOL 10 MG/ML
INJECTION, EMULSION INTRAVENOUS
Status: DISCONTINUED | OUTPATIENT
Start: 2025-04-10 | End: 2025-04-10 | Stop reason: SDUPTHER

## 2025-04-10 RX ORDER — MIDAZOLAM HYDROCHLORIDE 1 MG/ML
INJECTION, SOLUTION INTRAMUSCULAR; INTRAVENOUS
Status: DISCONTINUED | OUTPATIENT
Start: 2025-04-10 | End: 2025-04-10 | Stop reason: SDUPTHER

## 2025-04-10 RX ORDER — SODIUM CHLORIDE 9 MG/ML
INJECTION, SOLUTION INTRAVENOUS PRN
Status: DISCONTINUED | OUTPATIENT
Start: 2025-04-10 | End: 2025-04-10 | Stop reason: HOSPADM

## 2025-04-10 RX ORDER — HYDROMORPHONE HYDROCHLORIDE 1 MG/ML
0.25 INJECTION, SOLUTION INTRAMUSCULAR; INTRAVENOUS; SUBCUTANEOUS EVERY 5 MIN PRN
Status: DISCONTINUED | OUTPATIENT
Start: 2025-04-10 | End: 2025-04-10 | Stop reason: HOSPADM

## 2025-04-10 RX ORDER — PROCHLORPERAZINE EDISYLATE 5 MG/ML
5 INJECTION INTRAMUSCULAR; INTRAVENOUS
Status: DISCONTINUED | OUTPATIENT
Start: 2025-04-10 | End: 2025-04-10 | Stop reason: HOSPADM

## 2025-04-10 RX ORDER — SODIUM CHLORIDE 0.9 % (FLUSH) 0.9 %
5-40 SYRINGE (ML) INJECTION EVERY 12 HOURS SCHEDULED
Status: DISCONTINUED | OUTPATIENT
Start: 2025-04-10 | End: 2025-04-10 | Stop reason: HOSPADM

## 2025-04-10 RX ORDER — SODIUM CHLORIDE 9 MG/ML
INJECTION, SOLUTION INTRAVENOUS
Status: DISCONTINUED | OUTPATIENT
Start: 2025-04-10 | End: 2025-04-10 | Stop reason: SDUPTHER

## 2025-04-10 RX ORDER — OXYCODONE AND ACETAMINOPHEN 5; 325 MG/1; MG/1
1 TABLET ORAL EVERY 6 HOURS PRN
Qty: 28 TABLET | Refills: 0 | Status: SHIPPED | OUTPATIENT
Start: 2025-04-10 | End: 2025-04-17 | Stop reason: SDUPTHER

## 2025-04-10 RX ORDER — LIDOCAINE HYDROCHLORIDE 20 MG/ML
INJECTION, SOLUTION INTRAVENOUS
Status: DISCONTINUED | OUTPATIENT
Start: 2025-04-10 | End: 2025-04-10 | Stop reason: SDUPTHER

## 2025-04-10 RX ORDER — HYDROMORPHONE HYDROCHLORIDE 1 MG/ML
0.5 INJECTION, SOLUTION INTRAMUSCULAR; INTRAVENOUS; SUBCUTANEOUS EVERY 5 MIN PRN
Status: DISCONTINUED | OUTPATIENT
Start: 2025-04-10 | End: 2025-04-10 | Stop reason: HOSPADM

## 2025-04-10 RX ORDER — FENTANYL CITRATE 50 UG/ML
INJECTION, SOLUTION INTRAMUSCULAR; INTRAVENOUS
Status: DISCONTINUED | OUTPATIENT
Start: 2025-04-10 | End: 2025-04-10 | Stop reason: SDUPTHER

## 2025-04-10 RX ORDER — NALOXONE HYDROCHLORIDE 0.4 MG/ML
INJECTION, SOLUTION INTRAMUSCULAR; INTRAVENOUS; SUBCUTANEOUS PRN
Status: DISCONTINUED | OUTPATIENT
Start: 2025-04-10 | End: 2025-04-10 | Stop reason: HOSPADM

## 2025-04-10 RX ORDER — ASPIRIN 81 MG/1
81 TABLET ORAL 2 TIMES DAILY
Qty: 60 TABLET | Refills: 0 | Status: SHIPPED | OUTPATIENT
Start: 2025-04-10 | End: 2025-04-17 | Stop reason: SDUPTHER

## 2025-04-10 RX ORDER — ONDANSETRON 2 MG/ML
INJECTION INTRAMUSCULAR; INTRAVENOUS
Status: DISCONTINUED | OUTPATIENT
Start: 2025-04-10 | End: 2025-04-10 | Stop reason: SDUPTHER

## 2025-04-10 RX ORDER — DEXAMETHASONE SODIUM PHOSPHATE 10 MG/ML
INJECTION, SOLUTION INTRA-ARTICULAR; INTRALESIONAL; INTRAMUSCULAR; INTRAVENOUS; SOFT TISSUE
Status: DISCONTINUED | OUTPATIENT
Start: 2025-04-10 | End: 2025-04-10 | Stop reason: SDUPTHER

## 2025-04-10 RX ADMIN — LIDOCAINE HYDROCHLORIDE 100 MG: 20 INJECTION, SOLUTION INTRAVENOUS at 14:16

## 2025-04-10 RX ADMIN — HYDROMORPHONE HYDROCHLORIDE 0.5 MG: 1 INJECTION, SOLUTION INTRAMUSCULAR; INTRAVENOUS; SUBCUTANEOUS at 14:57

## 2025-04-10 RX ADMIN — PROPOFOL 200 MG: 10 INJECTION, EMULSION INTRAVENOUS at 14:16

## 2025-04-10 RX ADMIN — FENTANYL CITRATE 100 MCG: 0.05 INJECTION, SOLUTION INTRAMUSCULAR; INTRAVENOUS at 14:16

## 2025-04-10 RX ADMIN — FENTANYL CITRATE 25 MCG: 0.05 INJECTION, SOLUTION INTRAMUSCULAR; INTRAVENOUS at 14:27

## 2025-04-10 RX ADMIN — ONDANSETRON 4 MG: 2 INJECTION, SOLUTION INTRAMUSCULAR; INTRAVENOUS at 14:28

## 2025-04-10 RX ADMIN — HYDROMORPHONE HYDROCHLORIDE 0.5 MG: 1 INJECTION, SOLUTION INTRAMUSCULAR; INTRAVENOUS; SUBCUTANEOUS at 15:03

## 2025-04-10 RX ADMIN — SODIUM CHLORIDE: 9 INJECTION, SOLUTION INTRAVENOUS at 14:06

## 2025-04-10 RX ADMIN — MIDAZOLAM 2 MG: 1 INJECTION INTRAMUSCULAR; INTRAVENOUS at 14:09

## 2025-04-10 RX ADMIN — CEFAZOLIN 2000 MG: 2 INJECTION, POWDER, FOR SOLUTION INTRAMUSCULAR; INTRAVENOUS at 14:19

## 2025-04-10 RX ADMIN — DEXAMETHASONE SODIUM PHOSPHATE 10 MG: 10 INJECTION INTRAMUSCULAR; INTRAVENOUS at 14:21

## 2025-04-10 ASSESSMENT — PAIN DESCRIPTION - PAIN TYPE
TYPE: SURGICAL PAIN

## 2025-04-10 ASSESSMENT — ENCOUNTER SYMPTOMS: SHORTNESS OF BREATH: 0

## 2025-04-10 ASSESSMENT — PAIN DESCRIPTION - DESCRIPTORS
DESCRIPTORS: ACHING
DESCRIPTORS: SHARP
DESCRIPTORS: ACHING
DESCRIPTORS: SHARP
DESCRIPTORS: THROBBING
DESCRIPTORS: SHARP
DESCRIPTORS: SHARP

## 2025-04-10 ASSESSMENT — PAIN DESCRIPTION - LOCATION
LOCATION: LEG

## 2025-04-10 ASSESSMENT — PAIN - FUNCTIONAL ASSESSMENT
PAIN_FUNCTIONAL_ASSESSMENT: 0-10
PAIN_FUNCTIONAL_ASSESSMENT: 0-10

## 2025-04-10 ASSESSMENT — PAIN SCALES - GENERAL
PAINLEVEL_OUTOF10: 10
PAINLEVEL_OUTOF10: 10
PAINLEVEL_OUTOF10: 4
PAINLEVEL_OUTOF10: 10
PAINLEVEL_OUTOF10: 10

## 2025-04-10 ASSESSMENT — PAIN DESCRIPTION - ORIENTATION
ORIENTATION: RIGHT

## 2025-04-10 ASSESSMENT — LIFESTYLE VARIABLES: SMOKING_STATUS: 1

## 2025-04-10 NOTE — DISCHARGE INSTRUCTIONS
[facility] Department of Orthopedic Surgery  [address] [address]    Dr. Fabien Saeed, DO         MD Dr. Fabien Silva MD Frank Ansevin, PA-C Sara Zatchok, PA-C Tyler Tsangaris PA-C      Orthopaedics Discharge Instructions   Weight bearing Status - Non-weight bearing - on right lower Extremity  Pain Medication   Contact Office for Medication Refill- [phone]  Office can refill pain medications no sooner than every 7 days  If patient discharging to facility then pain control will be continued per facility physician  Ice to operative/injured site for 15-30 minutes of each hour for next 5 days    Recommend that you continue to ice the area 2-3 times per day after this   Elevate operative/injured limb on 2 pillows at home  Goal is to have limb above the heart if able  Continue DVT Prophylaxis (blood clot prevention) as prescribed: Aspirin 81 twice daily  Wound care -please keep soft dressing in place for 5 days then transition to soft dressing  Fracture Care -ice and elevate    Follow up in office in approximately 2 weeks. Your first follow up appointment is often with one of our physician assistants.     Call the office at [phone] if having any concerns.     Watch for these significant complications.  Call physician if they or any other problems occur:  Fever over 101°, redness, swelling or warmth at the operative site  Unrelieved nausea    Foul smelling or cloudy drainage at the operative site   Unrelieved pain    Blood soaked dressing. (Some oozing may be normal)     Numb, pale, blue, cold or tingling extremity          It is the Department of Orthopaedic Trauma's standard of practice that providers will de-escalate (wean) all patients from narcotic (opioid) medications during the post-operative period.   We provide multimodal pain control, but opioid medications are tapered in all of our patients.  If patient requires referral to pain management for

## 2025-04-10 NOTE — PROGRESS NOTES
Dr. Martinez aware that patient needs an IV; IV team consulted and waiting on urine drug screen to result.

## 2025-04-10 NOTE — OP NOTE
Operative Note      Patient: Aileen Raman  YOB: 1990  MRN: [id number]    Date of Procedure: 4/10/2025    Pre-op diagnosis:  1.  Right subtalar dislocation status post reduction and retained external fixator    2.  Multiple fractures of the subtalar joint and base of fourth metatarsal fracture      Post-Op Diagnosis: Same       Procedure:  1.  Move external fixator right lower extremity    2.  Fluoroscopic evaluation of the stability of the right subtalar joint    Surgeon(s):  Jim Mcmullen MD    Assistant:   * No surgical staff found *    Anesthesia: General    Estimated Blood Loss (mL): Minimal    Complications: None    Specimens:   * No specimens in log *    Implants:  * No implants in log *      Drains: * No LDAs found *    Findings:  Infection Present At Time Of Surgery (PATOS) (choose all levels that have infection present):  No infection present  Other Findings: Subtalar joint grossly stable on examination after removal of the external fixator.    Detailed Description of Procedure:   Patient is brought to the operating room in a supine position on hospital room bed.  Patient was transferred to the operating room table by multiple individuals in a safe fashion with anesthesia and controlled patient C-spine area.  Once in the operating room table placed pressure identified well-padded.  Right lower extremity sterilely prepped and draped in a standard orthopedic fashion.  Timeout was performed indicating the appropriate implication of the patient, the procedure to be performed, and side to be performed palm.  This agreed upon by individuals in the room.  After the timeout the external fixator was safely removed and the pin sites were also removed.  The pin sites were curetted and irrigated.  Fluoroscopy is brought in position obtaining AP mortise lateral views of the ankle as well as multiple views of the subtalar joint even with gentle stress of the subtalar joint remained located

## 2025-04-10 NOTE — ANESTHESIA PRE PROCEDURE
PROTIME 12.1 02/15/2025 11:40 PM    INR 1.1 02/15/2025 11:40 PM    APTT 35.5 02/15/2025 11:40 PM       HCG (If Applicable):   Lab Results   Component Value Date    PREGTESTUR negative 04/28/2018        ABGs: No results found for: \"PHART\", \"PO2ART\", \"SRD7NGM\", \"COW7CRY\", \"BEART\", \"X6FAFQUF\"     Type & Screen (If Applicable):  Lab Results   Component Value Date    ABORH O POSITIVE 02/15/2025    LABANTI NEGATIVE 02/15/2025       Drug/Infectious Status (If Applicable):  No results found for: \"HIV\", \"HEPCAB\"    COVID-19 Screening (If Applicable):   Lab Results   Component Value Date/Time    COVID19 Not Detected 11/09/2024 07:26 PM           Anesthesia Evaluation  Patient summary reviewed and Nursing notes reviewed   no history of anesthetic complications:   Airway: Mallampati: III  TM distance: >3 FB   Neck ROM: full  Comment: Upper Lip Bite Test - Class III     Mouth opening: > = 3 FB   Dental:          Pulmonary: breath sounds clear to auscultation  (+)           asthma: current smoker    (-) shortness of breath                           Cardiovascular:  Exercise tolerance: good (>4 METS)      (-) orthopnea        Rate: normal                    Neuro/Psych:               GI/Hepatic/Renal:             Endo/Other:                     Abdominal:             Vascular:          Other Findings:             Anesthesia Plan      general     ASA 2       Induction: intravenous.    MIPS: Postoperative opioids intended and Prophylactic antiemetics administered.  Anesthetic plan and risks discussed with patient.      Plan discussed with CRNA.                    Barb Martinez MD   4/10/2025

## 2025-04-10 NOTE — ANESTHESIA POSTPROCEDURE EVALUATION
Department of Anesthesiology  Postprocedure Note    Patient: Aileen Raman  MRN: [id number]  YOB: 1990  Date of evaluation: 4/10/2025    Procedure Summary       Date: 04/10/25 Room / Location: [address]    Anesthesia Start: 1406 Anesthesia Stop: 1444    Procedure: REMOVAL OF EXTERNAL FIXATOR FROM RIGHT LOWER EXTREMITY (Right: Leg Lower) Diagnosis:       Closed traumatic dislocation of subtalar joint, right, subsequent encounter      Closed fracture of right ankle      Presence of artificial extremity      (Closed traumatic dislocation of subtalar joint, right, subsequent encounter [S93.314D])      (Closed fracture of right ankle [S82.891A])      (Presence of artificial extremity [Z97.10])    Surgeons: Jim Mcmullen MD Responsible Provider: Barb Martinez MD    Anesthesia Type: general ASA Status: 2            Anesthesia Type: No value filed.    Tuan Phase I: Tuan Score: 10    Tuan Phase II: Tuan Score: 10    Anesthesia Post Evaluation    Patient location during evaluation: PACU  Patient participation: complete - patient participated  Level of consciousness: awake and alert  Airway patency: patent  Nausea & Vomiting: no vomiting and no nausea  Cardiovascular status: hemodynamically stable  Respiratory status: spontaneous ventilation, nonlabored ventilation and acceptable  Hydration status: stable  Pain management: adequate and satisfactory to patient        No notable events documented.

## 2025-04-10 NOTE — INTERVAL H&P NOTE
Update History & Physical    The patient's History and Physical of March 26, 2025 was reviewed with the patient and I examined the patient. There was no change. The surgical site was confirmed by the patient and me.     Plan: The risks, benefits, expected outcome, and alternative to the recommended procedure have been discussed with the patient. Patient understands and wants to proceed with the procedure.     Electronically signed by Jim Mcmullen MD on 4/10/2025 at 11:29 AM

## 2025-04-10 NOTE — PROGRESS NOTES
Pt verified using 2 Identifiers and ID band with OR staff prior to acceptance to PACU/Phase II care

## 2025-04-11 ENCOUNTER — HOSPITAL ENCOUNTER (OUTPATIENT)
Dept: GENERAL RADIOLOGY | Age: 35
Discharge: HOME | End: 2025-04-13

## 2025-04-11 DIAGNOSIS — R52 PAIN: ICD-10-CM

## 2025-04-14 ENCOUNTER — TELEPHONE (OUTPATIENT)
Dept: ORTHOPEDIC SURGERY | Age: 35
End: 2025-04-14

## 2025-04-14 NOTE — TELEPHONE ENCOUNTER
Mother called and left VM. States pt is in a lot of pain. She is requesting stronger pain meds. States pt is taking Percocet 5/325 and is using ice and elevation. Called pt's mother and instructed to supplement Percocet with Tylenol and Ibuprofen. Can also use Robaxin. Verbalized understanding.   none

## 2025-04-17 DIAGNOSIS — S93.314D CLOSED TRAUMATIC DISLOCATION OF RIGHT SUBTALAR JOINT, SUBSEQUENT ENCOUNTER: ICD-10-CM

## 2025-04-17 DIAGNOSIS — G89.18 POST-OP PAIN: ICD-10-CM

## 2025-04-17 DIAGNOSIS — M54.2 NECK PAIN: ICD-10-CM

## 2025-04-17 RX ORDER — ACETAMINOPHEN 500 MG
1000 TABLET ORAL EVERY 6 HOURS PRN
Qty: 120 TABLET | Refills: 3 | Status: SHIPPED | OUTPATIENT
Start: 2025-04-17

## 2025-04-17 RX ORDER — GABAPENTIN 300 MG/1
600 CAPSULE ORAL 3 TIMES DAILY
Qty: 180 CAPSULE | Refills: 0 | Status: SHIPPED | OUTPATIENT
Start: 2025-04-17 | End: 2025-05-17

## 2025-04-17 RX ORDER — OXYCODONE AND ACETAMINOPHEN 5; 325 MG/1; MG/1
1 TABLET ORAL EVERY 6 HOURS PRN
Qty: 28 TABLET | Refills: 0 | Status: SHIPPED | OUTPATIENT
Start: 2025-04-17 | End: 2025-04-24

## 2025-04-17 RX ORDER — ASPIRIN 81 MG/1
81 TABLET ORAL 2 TIMES DAILY
Qty: 60 TABLET | Refills: 0 | Status: SHIPPED | OUTPATIENT
Start: 2025-04-17 | End: 2025-05-17

## 2025-04-17 RX ORDER — METHOCARBAMOL 500 MG/1
1000 TABLET, FILM COATED ORAL 4 TIMES DAILY
Qty: 112 TABLET | Refills: 0 | Status: SHIPPED | OUTPATIENT
Start: 2025-04-17 | End: 2025-05-01

## 2025-04-17 NOTE — TELEPHONE ENCOUNTER
Pharmacy interface requesting refill of  Diclofenac 50 mg .    Last office visit:3/26/25    Future Appointments   Date Time Provider Department Center   4/28/2025  1:00 PM SCHEDULE, SE ORTHO APC SE [facility]

## 2025-04-17 NOTE — TELEPHONE ENCOUNTER
Received call from patient requesting refill of Percocet 5/325 mg and ASA .    Date of Procedure: 4/10/2025     Post-Op Diagnosis: Same       Procedure:  1.  Move external fixator right lower extremity     2.  Fluoroscopic evaluation of the stability of the right subtalar joint     Surgeon(s):  Jim Mcmullen MD    Weeks from Surgery: 1    Last OV: 3/26/25    Medication pended and routed to providers for decision and signature.    Future Appointments   Date Time Provider Department Center   4/28/2025  1:00 PM SCHEDULE, [facility]       Electronically signed by Teri Burgos RN on 4/17/2025 at 7:51 AM

## 2025-04-17 NOTE — TELEPHONE ENCOUNTER
Tylenol, gabapentin, and robaxin refilled      Controlled Substance Monitoring:    Acute and Chronic Pain Monitoring:   RX Monitoring Periodic Controlled Substance Monitoring   4/17/2025   8:21 AM No signs of potential drug abuse or diversion identified.

## 2025-04-17 NOTE — TELEPHONE ENCOUNTER
Pharmacy interface requesting refill of Robaxin 500 mg and Tylenol, Gabapentin .    Last office visit: 3/26/25    Future Appointments   Date Time Provider Department Center   4/28/2025  1:00 PM SCHEDULE, [facility]  [facility]

## 2025-04-17 NOTE — TELEPHONE ENCOUNTER
ASA and percocet refilled      Controlled Substance Monitoring:    Acute and Chronic Pain Monitoring:   RX Monitoring Periodic Controlled Substance Monitoring   4/17/2025   8:21 AM No signs of potential drug abuse or diversion identified.

## 2025-04-22 DIAGNOSIS — S93.314D CLOSED TRAUMATIC DISLOCATION OF RIGHT SUBTALAR JOINT, SUBSEQUENT ENCOUNTER: Primary | ICD-10-CM

## 2025-04-23 DIAGNOSIS — G89.18 POST-OP PAIN: ICD-10-CM

## 2025-04-23 RX ORDER — OXYCODONE AND ACETAMINOPHEN 5; 325 MG/1; MG/1
1 TABLET ORAL EVERY 8 HOURS PRN
Qty: 21 TABLET | Refills: 0 | Status: SHIPPED | OUTPATIENT
Start: 2025-04-23 | End: 2025-04-30

## 2025-04-23 NOTE — TELEPHONE ENCOUNTER
Aileen Raman    Percocet refilled    OARRS report reviewed  Last RX filled on 4/17/25  Plan for wean: Q8 hours    Controlled Substance Monitoring:    Acute and Chronic Pain Monitoring:   RX Monitoring Periodic Controlled Substance Monitoring   4/23/2025   3:07 PM No signs of potential drug abuse or diversion identified.        Electronically signed by Trei Perez PA-C on 4/23/2025 at 3:08 PM

## 2025-04-23 NOTE — TELEPHONE ENCOUNTER
Received call from patient requesting refill of Percocet 5/325 mg at [facility].    Date of Procedure: 4/10/2025     Post-Op Diagnosis: Same       Procedure:  1.  Move external fixator right lower extremity     2.  Fluoroscopic evaluation of the stability of the right subtalar joint     Surgeon(s):  Jim Mcmullen MD    Weeks from Surgery: 2    Medication pended and routed to providers for decision and signature.    Future Appointments   Date Time Provider Department Center   4/28/2025  1:00 PM SCHEDULE, [facility]       Electronically signed by Teri Burgos RN on 4/23/2025 at 2:39 PM

## 2025-04-24 DIAGNOSIS — S93.314D CLOSED TRAUMATIC DISLOCATION OF RIGHT SUBTALAR JOINT, SUBSEQUENT ENCOUNTER: ICD-10-CM

## 2025-04-24 RX ORDER — GABAPENTIN 300 MG/1
600 CAPSULE ORAL 3 TIMES DAILY
Qty: 180 CAPSULE | Refills: 0 | Status: SHIPPED | OUTPATIENT
Start: 2025-04-24 | End: 2025-05-24

## 2025-04-28 ENCOUNTER — HOSPITAL ENCOUNTER (OUTPATIENT)
Dept: GENERAL RADIOLOGY | Age: 35
Discharge: HOME | End: 2025-04-30
Payer: COMMERCIAL

## 2025-04-28 ENCOUNTER — OFFICE VISIT (OUTPATIENT)
Dept: ORTHOPEDIC SURGERY | Age: 35
End: 2025-04-28
Payer: COMMERCIAL

## 2025-04-28 VITALS
TEMPERATURE: 99.7 F | DIASTOLIC BLOOD PRESSURE: 81 MMHG | HEART RATE: 98 BPM | OXYGEN SATURATION: 99 % | SYSTOLIC BLOOD PRESSURE: 125 MMHG | RESPIRATION RATE: 20 BRPM

## 2025-04-28 DIAGNOSIS — S93.314D CLOSED TRAUMATIC DISLOCATION OF RIGHT SUBTALAR JOINT, SUBSEQUENT ENCOUNTER: ICD-10-CM

## 2025-04-28 DIAGNOSIS — S93.314D CLOSED TRAUMATIC DISLOCATION OF RIGHT SUBTALAR JOINT, SUBSEQUENT ENCOUNTER: Primary | ICD-10-CM

## 2025-04-28 DIAGNOSIS — G89.18 POST-OP PAIN: ICD-10-CM

## 2025-04-28 DIAGNOSIS — M54.2 NECK PAIN: ICD-10-CM

## 2025-04-28 PROCEDURE — 73630 X-RAY EXAM OF FOOT: CPT

## 2025-04-28 PROCEDURE — 99213 OFFICE O/P EST LOW 20 MIN: CPT | Performed by: PHYSICIAN ASSISTANT

## 2025-04-28 PROCEDURE — 99024 POSTOP FOLLOW-UP VISIT: CPT | Performed by: PHYSICIAN ASSISTANT

## 2025-04-28 NOTE — PROGRESS NOTES
Chief Complaint   Patient presents with    Follow-up     2wk PO RLE Ex-fix Removal with R Subtalar Fluoroscopic exam w/ possible internal fixation. Patient states pain lvl 6        OP:SURGEON: Dr. Jim Mcmullen MD  DATE OF PROCEDURE: 4-10-25  PROCEDURE: 1.  Move external fixator right lower extremity     2.  Fluoroscopic evaluation of the stability of the right subtalar joint     POD: 2.5 weeks    Subjective:  Aileen Raman is following up from the above surgery. She is NWB on right lower extremity. She ambulates with assistive device, walker.  Pain to extremity is mild and is not taking prescribed pain medication. They denies numbness or tingling to the right lower extremity. Denies calf pain, chest pain, or shortness of breath.  Patient continues to use DVT prophylaxis, ASA 81 mg BID. Patient is not participating in therapy at this time.  She is doing okay after surgery.  States that she still has intermittent soreness and swelling in the ankle/foot which is improving.     Review of Systems -  All pertinent positives/negative in HPI     Objective:    General: Alert and oriented X 3, normocephalic atraumatic, external ears and eye normal, sclera clear, no acute distress, respirations easy and unlabored with no audible wheezes, skin warm and dry, speech and dress appropriate for noted age, affect euthymic.    Extremity:  Right Lower Extremity  Skin clean dry and intact, without signs of infection   Pin sites from Ex-Fix are healing nicely.  No drainage or erythema.  moderate edema noted to the ankle/foot  Compartments supple throughout thigh and leg  Calf supple and not tender  negative Homans  Demonstrates limited active motion with ankle DF/PF/toe flexion extension due to swelling  Presents in a wheelchair  States sensation intact to touch in sural, deep peroneal, superficial peroneal, saphenous, posterior tibial  nerve distributions to foot/ankle.  Palpable dorsalis pedis and posterior tibialis pulses,

## 2025-04-28 NOTE — PATIENT INSTRUCTIONS
Start progressive weightbearing on the right lower extremity in Boot. Start with 25% of body weight for 7-10 days and if tolerating well, no significant increased pain or swelling ok to progress to 50% of body weight in Boot for 7-10 days. Then to 75% of body weight for 7-10 days, then to 100% on on right lower extremity in the Boot. Once full weight bearing in the Boot for 1-2 weeks and tolerating well OK to start weaning out of Boot.      At this point, boot only needs to be worn while weightbearing.    Continue aspirin for DVT prophylaxis until fully weightbearing.    She is getting home nursing currently with [facility].  Okay to start home PT with [facility].    Follow-up in 4 weeks for reevaluation and x-rays.    Call if any questions or concerns

## 2025-04-29 RX ORDER — METHOCARBAMOL 500 MG/1
1000 TABLET, FILM COATED ORAL 4 TIMES DAILY
Qty: 112 TABLET | Refills: 0 | Status: SHIPPED | OUTPATIENT
Start: 2025-04-29 | End: 2025-05-06 | Stop reason: SDUPTHER

## 2025-04-29 RX ORDER — ASPIRIN 81 MG/1
81 TABLET ORAL 2 TIMES DAILY
Qty: 60 TABLET | Refills: 0 | Status: SHIPPED | OUTPATIENT
Start: 2025-04-29 | End: 2025-05-06 | Stop reason: SDUPTHER

## 2025-04-29 RX ORDER — OXYCODONE AND ACETAMINOPHEN 5; 325 MG/1; MG/1
1 TABLET ORAL EVERY 8 HOURS PRN
Qty: 21 TABLET | Refills: 0 | Status: SHIPPED | OUTPATIENT
Start: 2025-04-29 | End: 2025-05-06 | Stop reason: SDUPTHER

## 2025-04-29 RX ORDER — GABAPENTIN 300 MG/1
600 CAPSULE ORAL 3 TIMES DAILY
Qty: 180 CAPSULE | Refills: 0 | Status: SHIPPED | OUTPATIENT
Start: 2025-04-29 | End: 2025-05-06 | Stop reason: SDUPTHER

## 2025-04-29 RX ORDER — ACETAMINOPHEN 500 MG
1000 TABLET ORAL EVERY 6 HOURS PRN
Qty: 120 TABLET | Refills: 3 | Status: SHIPPED | OUTPATIENT
Start: 2025-04-29

## 2025-04-29 NOTE — TELEPHONE ENCOUNTER
Meds refilled      Make sure she is not taking both voltaren and ibuprofen. Choose one NSAID.      Controlled Substance Monitoring:    Acute and Chronic Pain Monitoring:   RX Monitoring Periodic Controlled Substance Monitoring   4/29/2025   8:12 AM No signs of potential drug abuse or diversion identified.

## 2025-04-29 NOTE — TELEPHONE ENCOUNTER
Percocet refilled    Controlled Substance Monitoring:    Acute and Chronic Pain Monitoring:   RX Monitoring Periodic Controlled Substance Monitoring   4/29/2025   8:12 AM No signs of potential drug abuse or diversion identified.

## 2025-04-29 NOTE — TELEPHONE ENCOUNTER
[facility] interface requesting refill of ASA 81, Tylenol 500 mg, Robaxin 500mg, Gabapentin 300mg, Voltaren 50mg. Routing to providers for review.        Last office visit:04/28/25    Future Appointments   Date Time Provider Department Center   6/2/2025  1:30 PM SCHEDULE, [facility]

## 2025-04-29 NOTE — TELEPHONE ENCOUNTER
Pharmacy interface requesting refill of Percocet 5/325 mg.    Last office visit: 04/28/25    Future Appointments   Date Time Provider Department Center   6/2/2025  1:30 PM SCHEDULE, [facility]

## 2025-05-06 DIAGNOSIS — G89.18 POST-OP PAIN: ICD-10-CM

## 2025-05-06 DIAGNOSIS — M54.2 NECK PAIN: ICD-10-CM

## 2025-05-06 DIAGNOSIS — S93.314D CLOSED TRAUMATIC DISLOCATION OF RIGHT SUBTALAR JOINT, SUBSEQUENT ENCOUNTER: ICD-10-CM

## 2025-05-06 RX ORDER — OXYCODONE AND ACETAMINOPHEN 5; 325 MG/1; MG/1
1 TABLET ORAL 2 TIMES DAILY PRN
Qty: 14 TABLET | Refills: 0 | Status: CANCELLED | OUTPATIENT
Start: 2025-05-06 | End: 2025-05-13

## 2025-05-06 RX ORDER — ACETAMINOPHEN 500 MG
1000 TABLET ORAL EVERY 6 HOURS PRN
Qty: 120 TABLET | Refills: 3 | Status: CANCELLED | OUTPATIENT
Start: 2025-05-06

## 2025-05-06 RX ORDER — OXYCODONE AND ACETAMINOPHEN 5; 325 MG/1; MG/1
1 TABLET ORAL 2 TIMES DAILY PRN
Qty: 14 TABLET | Refills: 0 | Status: SHIPPED | OUTPATIENT
Start: 2025-05-06 | End: 2025-05-13

## 2025-05-06 NOTE — TELEPHONE ENCOUNTER
Left detailed message for the patient to let her know that we have called in her pain medicine and that she needed to wean down to BID. If she has further questions she will call the office.

## 2025-05-06 NOTE — TELEPHONE ENCOUNTER
Received call from patient requesting refill of percocet 5/325 at [facility].     Date of Procedure: 04/10/2025     Procedure:  Move external fixator right lower extremity     Surgeon:  TTS    Months from Surgery: 1 month     Last OV: 04/28/2025 - closed traumatic dislocation of R subtalar joint     Medication pended and routed to providers for decision and signature.

## 2025-05-06 NOTE — TELEPHONE ENCOUNTER
Percocet refilled and weaned to BID PRN      Controlled Substance Monitoring:    Acute and Chronic Pain Monitoring:   RX Monitoring Periodic Controlled Substance Monitoring   5/6/2025   1:09 PM No signs of potential drug abuse or diversion identified.

## 2025-05-07 RX ORDER — GABAPENTIN 300 MG/1
600 CAPSULE ORAL 3 TIMES DAILY
Qty: 180 CAPSULE | Refills: 0 | Status: SHIPPED | OUTPATIENT
Start: 2025-05-07 | End: 2025-06-06

## 2025-05-07 RX ORDER — METHOCARBAMOL 500 MG/1
1000 TABLET, FILM COATED ORAL 4 TIMES DAILY
Qty: 112 TABLET | Refills: 0 | Status: SHIPPED | OUTPATIENT
Start: 2025-05-07 | End: 2025-05-21

## 2025-05-07 RX ORDER — ASPIRIN 81 MG/1
81 TABLET ORAL 2 TIMES DAILY
Qty: 60 TABLET | Refills: 0 | Status: SHIPPED | OUTPATIENT
Start: 2025-05-07 | End: 2025-06-06

## 2025-05-07 NOTE — TELEPHONE ENCOUNTER
Percocet was refilled yesterday.  Voltaren and Tylenol have multiple refills each from previous scripts.    Robaxin, gabapentin, aspirin refilled      Controlled Substance Monitoring:    Acute and Chronic Pain Monitoring:   RX Monitoring Periodic Controlled Substance Monitoring   5/7/2025   8:48 AM No signs of potential drug abuse or diversion identified.

## 2025-05-10 PROBLEM — Z01.812 PRE-OPERATIVE LABORATORY EXAMINATION: Status: RESOLVED | Noted: 2025-04-10 | Resolved: 2025-05-10

## 2025-05-14 ENCOUNTER — PATIENT MESSAGE (OUTPATIENT)
Age: 35
End: 2025-05-14

## 2025-05-14 DIAGNOSIS — G89.18 POST-OP PAIN: ICD-10-CM

## 2025-05-15 RX ORDER — OXYCODONE AND ACETAMINOPHEN 5; 325 MG/1; MG/1
1 TABLET ORAL DAILY PRN
Qty: 7 TABLET | Refills: 0 | Status: SHIPPED | OUTPATIENT
Start: 2025-05-15 | End: 2025-05-22

## 2025-05-15 NOTE — TELEPHONE ENCOUNTER
Received call from patient requesting refill of Percocet 5/325 mg .    Date of Procedure: 4/10/2025     Procedure:  1.  Move external fixator right lower extremity     2.  Fluoroscopic evaluation of the stability of the right subtalar joint     Surgeon(s):  Jim Mcmullen MD    Weeks from Surgery: 5    Last OV: 4/28/25    Medication pended and routed to providers for decision and signature.    Future Appointments   Date Time Provider Department Center   6/2/2025  1:30 PM SCHEDULE, [facility]       Electronically signed by Teri Burgos RN on 5/15/2025 at 11:46 AM

## 2025-05-15 NOTE — TELEPHONE ENCOUNTER
Percocet refilled and weaned to once daily    Controlled Substance Monitoring:    Acute and Chronic Pain Monitoring:   RX Monitoring Periodic Controlled Substance Monitoring   5/15/2025  11:57 AM No signs of potential drug abuse or diversion identified.

## 2025-05-22 ENCOUNTER — TELEPHONE (OUTPATIENT)
Age: 35
End: 2025-05-22

## 2025-05-22 DIAGNOSIS — G89.18 POST-OP PAIN: Primary | ICD-10-CM

## 2025-05-22 RX ORDER — OXYCODONE AND ACETAMINOPHEN 5; 325 MG/1; MG/1
1 TABLET ORAL AS NEEDED
Qty: 7 TABLET | Refills: 0 | Status: SHIPPED | OUTPATIENT
Start: 2025-05-22 | End: 2025-05-29

## 2025-05-22 NOTE — TELEPHONE ENCOUNTER
Patients mother calling asking for a refill on Christinas Oxy. Aileen is doing therapy and is in a lot of pain.  Please advise

## 2025-05-22 NOTE — TELEPHONE ENCOUNTER
Aileen Raman    Percocet refilled.  This is the patient's last narcotic pain medication that can be provided.    OARRS report reviewed  Last RX filled on 5/15/25      Controlled Substance Monitoring:    Acute and Chronic Pain Monitoring:   RX Monitoring Periodic Controlled Substance Monitoring   5/22/2025  10:53 AM No signs of potential drug abuse or diversion identified.        Electronically signed by Teri Perez PA-C on 5/22/2025 at 10:53 AM

## 2025-05-30 DIAGNOSIS — S93.314D CLOSED TRAUMATIC DISLOCATION OF RIGHT SUBTALAR JOINT, SUBSEQUENT ENCOUNTER: Primary | ICD-10-CM

## 2025-06-02 ENCOUNTER — TELEPHONE (OUTPATIENT)
Age: 35
End: 2025-06-02

## 2025-06-02 NOTE — TELEPHONE ENCOUNTER
06/02/25 No Show 7wk PO RLE Ex-fix Removal with R Subtalar Fluoroscopic exam w/ possible internal fixation DOS 04/10/25; TTS (*GP 7/9/25)

## 2025-06-03 ENCOUNTER — TELEPHONE (OUTPATIENT)
Age: 35
End: 2025-06-03

## 2025-06-03 NOTE — TELEPHONE ENCOUNTER
Patients mother called and left a message waiting to check time of appointment.   Patients number is not working. Returned call to the mother and unable to leave a message. No voicemail.  Patients [employer] is active.

## 2025-06-09 ENCOUNTER — TELEPHONE (OUTPATIENT)
Age: 35
End: 2025-06-09

## 2025-06-09 DIAGNOSIS — S93.314D CLOSED TRAUMATIC DISLOCATION OF RIGHT SUBTALAR JOINT, SUBSEQUENT ENCOUNTER: Primary | ICD-10-CM

## 2025-06-09 RX ORDER — GABAPENTIN 300 MG/1
600 CAPSULE ORAL 3 TIMES DAILY
Qty: 180 CAPSULE | Refills: 0 | Status: SHIPPED | OUTPATIENT
Start: 2025-06-09 | End: 2025-07-09

## 2025-06-09 NOTE — TELEPHONE ENCOUNTER
Aileen Raman    Gabapentin sent to pharmacy.     OARRS report reviewed.    PT sent to [place]    Controlled Substance Monitoring:    Acute and Chronic Pain Monitoring:   RX Monitoring Periodic Controlled Substance Monitoring   6/9/2025  12:49 PM No signs of potential drug abuse or diversion identified.        Electronically signed by Teri Perez PA-C on 6/9/2025 at 12:50 PM

## 2025-06-09 NOTE — TELEPHONE ENCOUNTER
PT called ofc to r/s missed appt 06/02/25.     She requested an order for OP PT. Home health PT is no longer coming. She also asks if she can have a RF of gabapentin. Please advise.     (P) [phone]- Mom, Radha

## 2025-06-18 ENCOUNTER — TELEPHONE (OUTPATIENT)
Age: 35
End: 2025-06-18

## 2025-06-18 NOTE — TELEPHONE ENCOUNTER
Patient no show   9 wk PO RLE Ex-fix Removal with R Subtalar Fluoroscopic exam w/ possible internal fixation DOS 04/10/25; TTS (*GP 7/9/25) n/s 06/02/25

## 2025-08-19 ENCOUNTER — TELEPHONE (OUTPATIENT)
Age: 35
End: 2025-08-19

## 2025-08-20 ENCOUNTER — TELEPHONE (OUTPATIENT)
Age: 35
End: 2025-08-20

## (undated) DEVICE — SOL IRR SOD CHL 0.9% TITAN XL CNTNR 3000ML

## (undated) DEVICE — BANDAGE COMPR W4INXL10YD WHITE/BEIGE E MTRX HK LOOP CLSR

## (undated) DEVICE — GOWN,AURORA,BRTHSLV,2XL,18/CS: Brand: MEDLINE

## (undated) DEVICE — CLAMP EXT FIX L TI ALLY COMB CLP ON SELF HLD

## (undated) DEVICE — ELECTRODE PT RET AD L9FT HI MOIST COND ADH HYDRGEL CORDED

## (undated) DEVICE — DRESSING,GAUZE,XEROFORM,CURAD,5"X9",ST: Brand: CURAD

## (undated) DEVICE — POST EXT FIX DIA11MM 30DEG OUTRIG MAG RESONANCE CONDITIONAL

## (undated) DEVICE — DRAPE EQUIP CARM 72X42 IN RUBBER BND CLP

## (undated) DEVICE — ROD EXT FIX L350MM DIA11MM C FBR MR CONDITIONAL

## (undated) DEVICE — LOWER EXT KNEE DRAPE: Brand: MEDLINE INDUSTRIES, INC.

## (undated) DEVICE — ELECTRODE ES L3IN S STL BLDE INSUL DISP VALLEYLAB EDGE

## (undated) DEVICE — PADDING,UNDERCAST,COTTON, 4"X4YD STERILE: Brand: MEDLINE

## (undated) DEVICE — TUBING SUCT 12FR MAL ALUM SHFT FN CAP VENT UNIV CONN W/ OBT

## (undated) DEVICE — SYRINGE MED 10ML TRNSLUC BRL PLUNG BLK MRK POLYPR CTRL

## (undated) DEVICE — SCREW EXT FIX L150MM DIA5MM THRD L150MM S STL SELF DRL MR

## (undated) DEVICE — PIN FIX L225MM DIA6MM S STL FOR L EXT FIX SET

## (undated) DEVICE — CLAMP EXT FIX L PIN 6 POS MAG RESONANCE CONDITIONAL

## (undated) DEVICE — CLAMP EXT FIX DIA8/11MM COMB CLP ON SELF HLD

## (undated) DEVICE — ROD EXT FIX L200MM DIA11MM C FBR MAG RESONANCE CONDITIONAL

## (undated) DEVICE — SCREW EXT FIX L80MM DIA4MM THRD DIA3MM S STL SELF DRL BLNT